# Patient Record
Sex: FEMALE | Race: WHITE | NOT HISPANIC OR LATINO | Employment: OTHER | URBAN - METROPOLITAN AREA
[De-identification: names, ages, dates, MRNs, and addresses within clinical notes are randomized per-mention and may not be internally consistent; named-entity substitution may affect disease eponyms.]

---

## 2018-01-11 ENCOUNTER — OFFICE VISIT CONVERTED (OUTPATIENT)
Dept: UROLOGY | Facility: CLINIC | Age: 83
End: 2018-01-11
Attending: UROLOGY

## 2018-01-25 ENCOUNTER — OFFICE VISIT CONVERTED (OUTPATIENT)
Dept: UROLOGY | Facility: CLINIC | Age: 83
End: 2018-01-25
Attending: UROLOGY

## 2019-03-25 ENCOUNTER — HOSPITAL ENCOUNTER (OUTPATIENT)
Dept: OTHER | Facility: HOSPITAL | Age: 84
Discharge: HOME | End: 2019-03-25
Attending: INTERNAL MEDICINE

## 2019-03-25 LAB
ALBUMIN SERPL-MCNC: 3.8 G/DL (ref 3.5–5)
ALBUMIN/GLOB SERPL: 1.4 {RATIO} (ref 1.4–2.6)
ALP SERPL-CCNC: 61 U/L (ref 43–160)
ALT SERPL-CCNC: 16 U/L (ref 10–40)
ANION GAP SERPL CALC-SCNC: 15 MMOL/L (ref 8–19)
AST SERPL-CCNC: 20 U/L (ref 15–50)
BILIRUB SERPL-MCNC: 0.24 MG/DL (ref 0.2–1.3)
BUN SERPL-MCNC: 19 MG/DL (ref 5–25)
BUN/CREAT SERPL: 32 {RATIO} (ref 6–20)
CALCIUM SERPL-MCNC: 9.2 MG/DL (ref 8.7–10.4)
CHLORIDE SERPL-SCNC: 105 MMOL/L (ref 99–111)
CONV CO2: 26 MMOL/L (ref 22–32)
CONV TOTAL PROTEIN: 6.5 G/DL (ref 6.3–8.2)
CREAT UR-MCNC: 0.6 MG/DL (ref 0.5–0.9)
GFR SERPLBLD BASED ON 1.73 SQ M-ARVRAT: >60 ML/MIN/{1.73_M2}
GLOBULIN UR ELPH-MCNC: 2.7 G/DL (ref 2–3.5)
GLUCOSE SERPL-MCNC: 90 MG/DL (ref 65–99)
OSMOLALITY SERPL CALC.SUM OF ELEC: 296 MOSM/KG (ref 273–304)
POTASSIUM SERPL-SCNC: 4.3 MMOL/L (ref 3.5–5.3)
SODIUM SERPL-SCNC: 142 MMOL/L (ref 135–147)

## 2019-05-28 ENCOUNTER — CONVERSION ENCOUNTER (OUTPATIENT)
Dept: SURGERY | Facility: CLINIC | Age: 84
End: 2019-05-28

## 2019-05-28 ENCOUNTER — OFFICE VISIT CONVERTED (OUTPATIENT)
Dept: SURGERY | Facility: CLINIC | Age: 84
End: 2019-05-28
Attending: NURSE PRACTITIONER

## 2019-09-24 ENCOUNTER — HOSPITAL ENCOUNTER (OUTPATIENT)
Dept: OTHER | Facility: HOSPITAL | Age: 84
Discharge: HOME | End: 2019-09-24
Attending: INTERNAL MEDICINE

## 2019-09-24 LAB
25(OH)D3 SERPL-MCNC: 22.2 NG/ML (ref 30–100)
ALBUMIN SERPL-MCNC: 4.1 G/DL (ref 3.5–5)
ALBUMIN/GLOB SERPL: 1.5 {RATIO} (ref 1.4–2.6)
ALP SERPL-CCNC: 62 U/L (ref 43–160)
ALT SERPL-CCNC: 17 U/L (ref 10–40)
ANION GAP SERPL CALC-SCNC: 21 MMOL/L (ref 8–19)
AST SERPL-CCNC: 20 U/L (ref 15–50)
BASOPHILS # BLD AUTO: 0.05 10*3/UL (ref 0–0.2)
BASOPHILS NFR BLD AUTO: 0.7 % (ref 0–3)
BILIRUB SERPL-MCNC: 0.26 MG/DL (ref 0.2–1.3)
BUN SERPL-MCNC: 20 MG/DL (ref 5–25)
BUN/CREAT SERPL: 30 {RATIO} (ref 6–20)
CALCIUM SERPL-MCNC: 9.7 MG/DL (ref 8.7–10.4)
CHLORIDE SERPL-SCNC: 106 MMOL/L (ref 99–111)
CONV ABS IMM GRAN: 0.04 10*3/UL (ref 0–0.2)
CONV CO2: 22 MMOL/L (ref 22–32)
CONV IMMATURE GRAN: 0.5 % (ref 0–1.8)
CONV TOTAL PROTEIN: 6.8 G/DL (ref 6.3–8.2)
CREAT UR-MCNC: 0.66 MG/DL (ref 0.5–0.9)
DEPRECATED RDW RBC AUTO: 45.6 FL (ref 36.4–46.3)
EOSINOPHIL # BLD AUTO: 0.24 10*3/UL (ref 0–0.7)
EOSINOPHIL # BLD AUTO: 3.2 % (ref 0–7)
ERYTHROCYTE [DISTWIDTH] IN BLOOD BY AUTOMATED COUNT: 13.4 % (ref 11.7–14.4)
GFR SERPLBLD BASED ON 1.73 SQ M-ARVRAT: >60 ML/MIN/{1.73_M2}
GLOBULIN UR ELPH-MCNC: 2.7 G/DL (ref 2–3.5)
GLUCOSE SERPL-MCNC: 89 MG/DL (ref 65–99)
HCT VFR BLD AUTO: 40.2 % (ref 37–47)
HGB BLD-MCNC: 12.8 G/DL (ref 12–16)
LYMPHOCYTES # BLD AUTO: 2.06 10*3/UL (ref 1–5)
LYMPHOCYTES NFR BLD AUTO: 27.1 % (ref 20–45)
MCH RBC QN AUTO: 29.5 PG (ref 27–31)
MCHC RBC AUTO-ENTMCNC: 31.8 G/DL (ref 33–37)
MCV RBC AUTO: 92.6 FL (ref 81–99)
MONOCYTES # BLD AUTO: 0.79 10*3/UL (ref 0.2–1.2)
MONOCYTES NFR BLD AUTO: 10.4 % (ref 3–10)
NEUTROPHILS # BLD AUTO: 4.41 10*3/UL (ref 2–8)
NEUTROPHILS NFR BLD AUTO: 58.1 % (ref 30–85)
NRBC CBCN: 0 % (ref 0–0.7)
OSMOLALITY SERPL CALC.SUM OF ELEC: 302 MOSM/KG (ref 273–304)
PLATELET # BLD AUTO: 301 10*3/UL (ref 130–400)
PMV BLD AUTO: 10.7 FL (ref 9.4–12.3)
POTASSIUM SERPL-SCNC: 4.3 MMOL/L (ref 3.5–5.3)
RBC # BLD AUTO: 4.34 10*6/UL (ref 4.2–5.4)
SODIUM SERPL-SCNC: 145 MMOL/L (ref 135–147)
TSH SERPL-ACNC: 5.8 M[IU]/L (ref 0.27–4.2)
WBC # BLD AUTO: 7.59 10*3/UL (ref 4.8–10.8)

## 2019-10-24 ENCOUNTER — HOSPITAL ENCOUNTER (OUTPATIENT)
Dept: GENERAL RADIOLOGY | Facility: HOSPITAL | Age: 84
Discharge: HOME | End: 2019-10-24
Attending: INTERNAL MEDICINE

## 2020-02-05 ENCOUNTER — OFFICE VISIT CONVERTED (OUTPATIENT)
Dept: SURGERY | Facility: CLINIC | Age: 85
End: 2020-02-05
Attending: SURGERY

## 2020-02-10 ENCOUNTER — HOSPITAL ENCOUNTER (OUTPATIENT)
Dept: LAB | Facility: HOSPITAL | Age: 85
Discharge: HOME | End: 2020-02-10
Attending: INTERNAL MEDICINE

## 2020-02-10 LAB
ALBUMIN SERPL-MCNC: 3.7 G/DL (ref 3.5–5)
ALBUMIN/GLOB SERPL: 1.2 {RATIO} (ref 1.4–2.6)
ALP SERPL-CCNC: 81 U/L (ref 43–160)
ALT SERPL-CCNC: 17 U/L (ref 10–40)
ANION GAP SERPL CALC-SCNC: 16 MMOL/L (ref 8–19)
AST SERPL-CCNC: 20 U/L (ref 15–50)
BASOPHILS # BLD AUTO: 0.05 10*3/UL (ref 0–0.2)
BASOPHILS NFR BLD AUTO: 0.7 % (ref 0–3)
BILIRUB SERPL-MCNC: 0.15 MG/DL (ref 0.2–1.3)
BUN SERPL-MCNC: 15 MG/DL (ref 5–25)
BUN/CREAT SERPL: 21 {RATIO} (ref 6–20)
CALCIUM SERPL-MCNC: 9.7 MG/DL (ref 8.7–10.4)
CHLORIDE SERPL-SCNC: 107 MMOL/L (ref 99–111)
CONV ABS IMM GRAN: 0.02 10*3/UL (ref 0–0.2)
CONV CO2: 25 MMOL/L (ref 22–32)
CONV IMMATURE GRAN: 0.3 % (ref 0–1.8)
CONV TOTAL PROTEIN: 6.8 G/DL (ref 6.3–8.2)
CREAT UR-MCNC: 0.71 MG/DL (ref 0.5–0.9)
DEPRECATED RDW RBC AUTO: 49 FL (ref 36.4–46.3)
EOSINOPHIL # BLD AUTO: 0.21 10*3/UL (ref 0–0.7)
EOSINOPHIL # BLD AUTO: 2.7 % (ref 0–7)
ERYTHROCYTE [DISTWIDTH] IN BLOOD BY AUTOMATED COUNT: 14.5 % (ref 11.7–14.4)
GFR SERPLBLD BASED ON 1.73 SQ M-ARVRAT: >60 ML/MIN/{1.73_M2}
GLOBULIN UR ELPH-MCNC: 3.1 G/DL (ref 2–3.5)
GLUCOSE SERPL-MCNC: 97 MG/DL (ref 65–99)
HCT VFR BLD AUTO: 37.8 % (ref 37–47)
HGB BLD-MCNC: 11.6 G/DL (ref 12–16)
LYMPHOCYTES # BLD AUTO: 3.12 10*3/UL (ref 1–5)
LYMPHOCYTES NFR BLD AUTO: 40.7 % (ref 20–45)
MCH RBC QN AUTO: 28.7 PG (ref 27–31)
MCHC RBC AUTO-ENTMCNC: 30.7 G/DL (ref 33–37)
MCV RBC AUTO: 93.6 FL (ref 81–99)
MONOCYTES # BLD AUTO: 0.86 10*3/UL (ref 0.2–1.2)
MONOCYTES NFR BLD AUTO: 11.2 % (ref 3–10)
NEUTROPHILS # BLD AUTO: 3.4 10*3/UL (ref 2–8)
NEUTROPHILS NFR BLD AUTO: 44.4 % (ref 30–85)
NRBC CBCN: 0 % (ref 0–0.7)
OSMOLALITY SERPL CALC.SUM OF ELEC: 299 MOSM/KG (ref 273–304)
PLATELET # BLD AUTO: 411 10*3/UL (ref 130–400)
PMV BLD AUTO: 10.8 FL (ref 9.4–12.3)
POTASSIUM SERPL-SCNC: 3.9 MMOL/L (ref 3.5–5.3)
RBC # BLD AUTO: 4.04 10*6/UL (ref 4.2–5.4)
SODIUM SERPL-SCNC: 144 MMOL/L (ref 135–147)
WBC # BLD AUTO: 7.66 10*3/UL (ref 4.8–10.8)

## 2020-05-06 ENCOUNTER — HOSPITAL ENCOUNTER (OUTPATIENT)
Dept: OTHER | Facility: HOSPITAL | Age: 85
Discharge: HOME | End: 2020-05-06
Attending: INTERNAL MEDICINE

## 2020-05-06 LAB
ALBUMIN SERPL-MCNC: 3.8 G/DL (ref 3.5–5)
ALBUMIN/GLOB SERPL: 1.4 {RATIO} (ref 1.4–2.6)
ALP SERPL-CCNC: 69 U/L (ref 43–160)
ALT SERPL-CCNC: 14 U/L (ref 10–40)
ANION GAP SERPL CALC-SCNC: 14 MMOL/L (ref 8–19)
AST SERPL-CCNC: 18 U/L (ref 15–50)
BASOPHILS # BLD AUTO: 0.05 10*3/UL (ref 0–0.2)
BASOPHILS NFR BLD AUTO: 0.6 % (ref 0–3)
BILIRUB SERPL-MCNC: 0.23 MG/DL (ref 0.2–1.3)
BUN SERPL-MCNC: 18 MG/DL (ref 5–25)
BUN/CREAT SERPL: 25 {RATIO} (ref 6–20)
CALCIUM SERPL-MCNC: 9.6 MG/DL (ref 8.7–10.4)
CHLORIDE SERPL-SCNC: 107 MMOL/L (ref 99–111)
CONV ABS IMM GRAN: 0.03 10*3/UL (ref 0–0.2)
CONV CO2: 26 MMOL/L (ref 22–32)
CONV IMMATURE GRAN: 0.4 % (ref 0–1.8)
CONV TOTAL PROTEIN: 6.6 G/DL (ref 6.3–8.2)
CREAT UR-MCNC: 0.71 MG/DL (ref 0.5–0.9)
DEPRECATED RDW RBC AUTO: 49.3 FL (ref 36.4–46.3)
EOSINOPHIL # BLD AUTO: 0.12 10*3/UL (ref 0–0.7)
EOSINOPHIL # BLD AUTO: 1.5 % (ref 0–7)
ERYTHROCYTE [DISTWIDTH] IN BLOOD BY AUTOMATED COUNT: 14 % (ref 11.7–14.4)
GFR SERPLBLD BASED ON 1.73 SQ M-ARVRAT: >60 ML/MIN/{1.73_M2}
GLOBULIN UR ELPH-MCNC: 2.8 G/DL (ref 2–3.5)
GLUCOSE SERPL-MCNC: 85 MG/DL (ref 65–99)
HCT VFR BLD AUTO: 37.4 % (ref 37–47)
HGB BLD-MCNC: 11.7 G/DL (ref 12–16)
LYMPHOCYTES # BLD AUTO: 1.89 10*3/UL (ref 1–5)
LYMPHOCYTES NFR BLD AUTO: 23.3 % (ref 20–45)
MCH RBC QN AUTO: 29.6 PG (ref 27–31)
MCHC RBC AUTO-ENTMCNC: 31.3 G/DL (ref 33–37)
MCV RBC AUTO: 94.7 FL (ref 81–99)
MONOCYTES # BLD AUTO: 0.87 10*3/UL (ref 0.2–1.2)
MONOCYTES NFR BLD AUTO: 10.7 % (ref 3–10)
NEUTROPHILS # BLD AUTO: 5.15 10*3/UL (ref 2–8)
NEUTROPHILS NFR BLD AUTO: 63.5 % (ref 30–85)
NRBC CBCN: 0 % (ref 0–0.7)
OSMOLALITY SERPL CALC.SUM OF ELEC: 297 MOSM/KG (ref 273–304)
PLATELET # BLD AUTO: 276 10*3/UL (ref 130–400)
PMV BLD AUTO: 11.3 FL (ref 9.4–12.3)
POTASSIUM SERPL-SCNC: 4.2 MMOL/L (ref 3.5–5.3)
RBC # BLD AUTO: 3.95 10*6/UL (ref 4.2–5.4)
SODIUM SERPL-SCNC: 143 MMOL/L (ref 135–147)
WBC # BLD AUTO: 8.11 10*3/UL (ref 4.8–10.8)

## 2020-11-12 ENCOUNTER — HOSPITAL ENCOUNTER (OUTPATIENT)
Dept: GENERAL RADIOLOGY | Facility: HOSPITAL | Age: 85
Discharge: HOME | End: 2020-11-12
Attending: INTERNAL MEDICINE

## 2020-11-12 LAB
25(OH)D3 SERPL-MCNC: 26.4 NG/ML (ref 30–100)
ALBUMIN SERPL-MCNC: 4 G/DL (ref 3.5–5)
ALBUMIN/GLOB SERPL: 1.4 {RATIO} (ref 1.4–2.6)
ALP SERPL-CCNC: 69 U/L (ref 43–160)
ALT SERPL-CCNC: 13 U/L (ref 10–40)
ANION GAP SERPL CALC-SCNC: 15 MMOL/L (ref 8–19)
AST SERPL-CCNC: 19 U/L (ref 15–50)
BASOPHILS # BLD AUTO: 0.03 10*3/UL (ref 0–0.2)
BASOPHILS NFR BLD AUTO: 0.4 % (ref 0–3)
BILIRUB SERPL-MCNC: 0.25 MG/DL (ref 0.2–1.3)
BUN SERPL-MCNC: 13 MG/DL (ref 5–25)
BUN/CREAT SERPL: 17 {RATIO} (ref 6–20)
CALCIUM SERPL-MCNC: 10.1 MG/DL (ref 8.7–10.4)
CHLORIDE SERPL-SCNC: 105 MMOL/L (ref 99–111)
CONV ABS IMM GRAN: 0.03 10*3/UL (ref 0–0.2)
CONV CO2: 27 MMOL/L (ref 22–32)
CONV IMMATURE GRAN: 0.4 % (ref 0–1.8)
CONV TOTAL PROTEIN: 6.8 G/DL (ref 6.3–8.2)
CREAT UR-MCNC: 0.78 MG/DL (ref 0.5–0.9)
DEPRECATED RDW RBC AUTO: 43.6 FL (ref 36.4–46.3)
EOSINOPHIL # BLD AUTO: 0.09 10*3/UL (ref 0–0.7)
EOSINOPHIL # BLD AUTO: 1.1 % (ref 0–7)
ERYTHROCYTE [DISTWIDTH] IN BLOOD BY AUTOMATED COUNT: 13.1 % (ref 11.7–14.4)
GFR SERPLBLD BASED ON 1.73 SQ M-ARVRAT: >60 ML/MIN/{1.73_M2}
GLOBULIN UR ELPH-MCNC: 2.8 G/DL (ref 2–3.5)
GLUCOSE SERPL-MCNC: 96 MG/DL (ref 65–99)
HCT VFR BLD AUTO: 39.9 % (ref 37–47)
HGB BLD-MCNC: 12.8 G/DL (ref 12–16)
LYMPHOCYTES # BLD AUTO: 2.05 10*3/UL (ref 1–5)
LYMPHOCYTES NFR BLD AUTO: 24 % (ref 20–45)
MCH RBC QN AUTO: 29.4 PG (ref 27–31)
MCHC RBC AUTO-ENTMCNC: 32.1 G/DL (ref 33–37)
MCV RBC AUTO: 91.7 FL (ref 81–99)
MONOCYTES # BLD AUTO: 0.72 10*3/UL (ref 0.2–1.2)
MONOCYTES NFR BLD AUTO: 8.4 % (ref 3–10)
NEUTROPHILS # BLD AUTO: 5.63 10*3/UL (ref 2–8)
NEUTROPHILS NFR BLD AUTO: 65.7 % (ref 30–85)
NRBC CBCN: 0 % (ref 0–0.7)
OSMOLALITY SERPL CALC.SUM OF ELEC: 296 MOSM/KG (ref 273–304)
PLATELET # BLD AUTO: 295 10*3/UL (ref 130–400)
PMV BLD AUTO: 10.3 FL (ref 9.4–12.3)
POTASSIUM SERPL-SCNC: 4.3 MMOL/L (ref 3.5–5.3)
RBC # BLD AUTO: 4.35 10*6/UL (ref 4.2–5.4)
SODIUM SERPL-SCNC: 143 MMOL/L (ref 135–147)
T4 FREE SERPL-MCNC: 1.2 NG/DL (ref 0.9–1.8)
TSH SERPL-ACNC: 2.75 M[IU]/L (ref 0.27–4.2)
WBC # BLD AUTO: 8.55 10*3/UL (ref 4.8–10.8)

## 2020-11-18 ENCOUNTER — HOSPITAL ENCOUNTER (OUTPATIENT)
Dept: LAB | Facility: HOSPITAL | Age: 85
Discharge: HOME | End: 2020-11-18
Attending: INTERNAL MEDICINE

## 2020-11-18 LAB
AMPHETAMINES UR QL SCN: NEGATIVE
BARBITURATES UR QL SCN: NEGATIVE
BENZODIAZ UR QL SCN: NEGATIVE
CONV COCAINE, UR: NEGATIVE
METHADONE UR QL SCN: NEGATIVE
OPIATES TESTED UR SCN: NEGATIVE
OXYCODONE UR QL SCN: NEGATIVE
PCP UR QL: NEGATIVE
THC SERPLBLD CFM-MCNC: NEGATIVE NG/ML

## 2021-05-15 VITALS — HEIGHT: 59 IN | BODY MASS INDEX: 26.21 KG/M2 | WEIGHT: 130 LBS | RESPIRATION RATE: 16 BRPM

## 2021-05-15 VITALS — WEIGHT: 128 LBS | BODY MASS INDEX: 25.8 KG/M2 | HEIGHT: 59 IN | HEART RATE: 78 BPM | OXYGEN SATURATION: 99 %

## 2021-06-17 ENCOUNTER — LAB (OUTPATIENT)
Dept: LAB | Facility: HOSPITAL | Age: 86
End: 2021-06-17

## 2021-06-17 ENCOUNTER — TRANSCRIBE ORDERS (OUTPATIENT)
Dept: LAB | Facility: HOSPITAL | Age: 86
End: 2021-06-17

## 2021-06-17 DIAGNOSIS — E03.9 HYPOTHYROIDISM, UNSPECIFIED TYPE: ICD-10-CM

## 2021-06-17 DIAGNOSIS — I10 HYPERTENSION, ESSENTIAL: ICD-10-CM

## 2021-06-17 DIAGNOSIS — Z79.899 ENCOUNTER FOR LONG-TERM (CURRENT) USE OF OTHER MEDICATIONS: ICD-10-CM

## 2021-06-17 DIAGNOSIS — R73.01 IMPAIRED FASTING GLUCOSE: ICD-10-CM

## 2021-06-17 DIAGNOSIS — I10 HYPERTENSION, ESSENTIAL: Primary | ICD-10-CM

## 2021-06-17 LAB
ALBUMIN SERPL-MCNC: 4.2 G/DL (ref 3.5–5.2)
ALBUMIN/GLOB SERPL: 2.2 G/DL
ALP SERPL-CCNC: 75 U/L (ref 39–117)
ALT SERPL W P-5'-P-CCNC: 17 U/L (ref 1–33)
ANION GAP SERPL CALCULATED.3IONS-SCNC: 6.7 MMOL/L (ref 5–15)
AST SERPL-CCNC: 23 U/L (ref 1–32)
BASOPHILS # BLD AUTO: 0.04 10*3/MM3 (ref 0–0.2)
BASOPHILS NFR BLD AUTO: 0.6 % (ref 0–1.5)
BILIRUB SERPL-MCNC: 0.3 MG/DL (ref 0–1.2)
BUN SERPL-MCNC: 17 MG/DL (ref 8–23)
BUN/CREAT SERPL: 25 (ref 7–25)
CALCIUM SPEC-SCNC: 9.3 MG/DL (ref 8.6–10.5)
CHLORIDE SERPL-SCNC: 103 MMOL/L (ref 98–107)
CHOLEST SERPL-MCNC: 201 MG/DL (ref 0–200)
CO2 SERPL-SCNC: 29.3 MMOL/L (ref 22–29)
CREAT SERPL-MCNC: 0.68 MG/DL (ref 0.57–1)
DEPRECATED RDW RBC AUTO: 40.2 FL (ref 37–54)
EOSINOPHIL # BLD AUTO: 0.13 10*3/MM3 (ref 0–0.4)
EOSINOPHIL NFR BLD AUTO: 2 % (ref 0.3–6.2)
ERYTHROCYTE [DISTWIDTH] IN BLOOD BY AUTOMATED COUNT: 12.4 % (ref 12.3–15.4)
GFR SERPL CREATININE-BSD FRML MDRD: 82 ML/MIN/1.73
GLOBULIN UR ELPH-MCNC: 1.9 GM/DL
GLUCOSE SERPL-MCNC: 77 MG/DL (ref 65–99)
HCT VFR BLD AUTO: 38.2 % (ref 34–46.6)
HDLC SERPL-MCNC: 65 MG/DL (ref 40–60)
HGB BLD-MCNC: 12.8 G/DL (ref 12–15.9)
IMM GRANULOCYTES # BLD AUTO: 0.03 10*3/MM3 (ref 0–0.05)
IMM GRANULOCYTES NFR BLD AUTO: 0.5 % (ref 0–0.5)
LDLC SERPL CALC-MCNC: 120 MG/DL (ref 0–100)
LDLC/HDLC SERPL: 1.82 {RATIO}
LYMPHOCYTES # BLD AUTO: 1.66 10*3/MM3 (ref 0.7–3.1)
LYMPHOCYTES NFR BLD AUTO: 25.2 % (ref 19.6–45.3)
MCH RBC QN AUTO: 29.6 PG (ref 26.6–33)
MCHC RBC AUTO-ENTMCNC: 33.5 G/DL (ref 31.5–35.7)
MCV RBC AUTO: 88.4 FL (ref 79–97)
MONOCYTES # BLD AUTO: 0.77 10*3/MM3 (ref 0.1–0.9)
MONOCYTES NFR BLD AUTO: 11.7 % (ref 5–12)
NEUTROPHILS NFR BLD AUTO: 3.96 10*3/MM3 (ref 1.7–7)
NEUTROPHILS NFR BLD AUTO: 60 % (ref 42.7–76)
NRBC BLD AUTO-RTO: 0 /100 WBC (ref 0–0.2)
PLATELET # BLD AUTO: 268 10*3/MM3 (ref 140–450)
PMV BLD AUTO: 10.8 FL (ref 6–12)
POTASSIUM SERPL-SCNC: 4.9 MMOL/L (ref 3.5–5.2)
PROT SERPL-MCNC: 6.1 G/DL (ref 6–8.5)
RBC # BLD AUTO: 4.32 10*6/MM3 (ref 3.77–5.28)
SODIUM SERPL-SCNC: 139 MMOL/L (ref 136–145)
TRIGL SERPL-MCNC: 87 MG/DL (ref 0–150)
TSH SERPL DL<=0.05 MIU/L-ACNC: 3.64 UIU/ML (ref 0.27–4.2)
VLDLC SERPL-MCNC: 16 MG/DL (ref 5–40)
WBC # BLD AUTO: 6.59 10*3/MM3 (ref 3.4–10.8)

## 2021-06-17 PROCEDURE — 80053 COMPREHEN METABOLIC PANEL: CPT

## 2021-06-17 PROCEDURE — 85025 COMPLETE CBC W/AUTO DIFF WBC: CPT

## 2021-06-17 PROCEDURE — 80061 LIPID PANEL: CPT

## 2021-06-17 PROCEDURE — 36415 COLL VENOUS BLD VENIPUNCTURE: CPT

## 2021-06-17 PROCEDURE — 84443 ASSAY THYROID STIM HORMONE: CPT

## 2021-10-14 ENCOUNTER — CLINICAL SUPPORT (OUTPATIENT)
Dept: INTERNAL MEDICINE | Facility: CLINIC | Age: 86
End: 2021-10-14

## 2021-10-14 DIAGNOSIS — Z23 NEED FOR INFLUENZA VACCINATION: Primary | ICD-10-CM

## 2021-10-14 DIAGNOSIS — Z23 NEED FOR IMMUNIZATION AGAINST INFLUENZA: ICD-10-CM

## 2021-10-14 PROCEDURE — G0008 ADMIN INFLUENZA VIRUS VAC: HCPCS

## 2021-10-14 PROCEDURE — 90662 IIV NO PRSV INCREASED AG IM: CPT

## 2021-11-29 DIAGNOSIS — G62.9 PERIPHERAL POLYNEUROPATHY: Primary | ICD-10-CM

## 2021-11-29 RX ORDER — PANTOPRAZOLE SODIUM 40 MG/1
TABLET, DELAYED RELEASE ORAL
Qty: 90 TABLET | Refills: 0 | Status: SHIPPED | OUTPATIENT
Start: 2021-11-29 | End: 2022-02-28 | Stop reason: SDUPTHER

## 2021-11-30 RX ORDER — GABAPENTIN 600 MG/1
TABLET ORAL
Qty: 90 TABLET | Refills: 0 | Status: SHIPPED | OUTPATIENT
Start: 2021-11-30 | End: 2021-12-27

## 2021-12-27 DIAGNOSIS — G62.9 PERIPHERAL POLYNEUROPATHY: ICD-10-CM

## 2021-12-28 ENCOUNTER — LAB (OUTPATIENT)
Dept: LAB | Facility: HOSPITAL | Age: 86
End: 2021-12-28

## 2021-12-28 ENCOUNTER — TRANSCRIBE ORDERS (OUTPATIENT)
Dept: LAB | Facility: HOSPITAL | Age: 86
End: 2021-12-28

## 2021-12-28 DIAGNOSIS — D64.9 ANEMIA, UNSPECIFIED TYPE: ICD-10-CM

## 2021-12-28 DIAGNOSIS — I50.9 HEART FAILURE, UNSPECIFIED HF CHRONICITY, UNSPECIFIED HEART FAILURE TYPE: ICD-10-CM

## 2021-12-28 DIAGNOSIS — I10 ESSENTIAL HYPERTENSION, MALIGNANT: ICD-10-CM

## 2021-12-28 DIAGNOSIS — I10 ESSENTIAL HYPERTENSION, MALIGNANT: Primary | ICD-10-CM

## 2021-12-28 DIAGNOSIS — Z79.899 ENCOUNTER FOR LONG-TERM (CURRENT) USE OF OTHER MEDICATIONS: ICD-10-CM

## 2021-12-28 LAB
ALBUMIN SERPL-MCNC: 4 G/DL (ref 3.5–5.2)
ALBUMIN/GLOB SERPL: 1.6 G/DL
ALP SERPL-CCNC: 76 U/L (ref 39–117)
ALT SERPL W P-5'-P-CCNC: 11 U/L (ref 1–33)
ANION GAP SERPL CALCULATED.3IONS-SCNC: 6.1 MMOL/L (ref 5–15)
AST SERPL-CCNC: 17 U/L (ref 1–32)
BASOPHILS # BLD AUTO: 0.04 10*3/MM3 (ref 0–0.2)
BASOPHILS NFR BLD AUTO: 0.6 % (ref 0–1.5)
BILIRUB SERPL-MCNC: 0.4 MG/DL (ref 0–1.2)
BUN SERPL-MCNC: 11 MG/DL (ref 8–23)
BUN/CREAT SERPL: 16.2 (ref 7–25)
CALCIUM SPEC-SCNC: 9.5 MG/DL (ref 8.6–10.5)
CHLORIDE SERPL-SCNC: 104 MMOL/L (ref 98–107)
CO2 SERPL-SCNC: 30.9 MMOL/L (ref 22–29)
CREAT SERPL-MCNC: 0.68 MG/DL (ref 0.57–1)
DEPRECATED RDW RBC AUTO: 41.1 FL (ref 37–54)
EOSINOPHIL # BLD AUTO: 0.12 10*3/MM3 (ref 0–0.4)
EOSINOPHIL NFR BLD AUTO: 1.8 % (ref 0.3–6.2)
ERYTHROCYTE [DISTWIDTH] IN BLOOD BY AUTOMATED COUNT: 12.7 % (ref 12.3–15.4)
GFR SERPL CREATININE-BSD FRML MDRD: 82 ML/MIN/1.73
GLOBULIN UR ELPH-MCNC: 2.5 GM/DL
GLUCOSE SERPL-MCNC: 84 MG/DL (ref 65–99)
HCT VFR BLD AUTO: 37.7 % (ref 34–46.6)
HGB BLD-MCNC: 12.3 G/DL (ref 12–15.9)
IMM GRANULOCYTES # BLD AUTO: 0.02 10*3/MM3 (ref 0–0.05)
IMM GRANULOCYTES NFR BLD AUTO: 0.3 % (ref 0–0.5)
LYMPHOCYTES # BLD AUTO: 1.92 10*3/MM3 (ref 0.7–3.1)
LYMPHOCYTES NFR BLD AUTO: 28.4 % (ref 19.6–45.3)
MCH RBC QN AUTO: 29.1 PG (ref 26.6–33)
MCHC RBC AUTO-ENTMCNC: 32.6 G/DL (ref 31.5–35.7)
MCV RBC AUTO: 89.3 FL (ref 79–97)
MONOCYTES # BLD AUTO: 0.82 10*3/MM3 (ref 0.1–0.9)
MONOCYTES NFR BLD AUTO: 12.1 % (ref 5–12)
NEUTROPHILS NFR BLD AUTO: 3.84 10*3/MM3 (ref 1.7–7)
NEUTROPHILS NFR BLD AUTO: 56.8 % (ref 42.7–76)
NRBC BLD AUTO-RTO: 0 /100 WBC (ref 0–0.2)
PLATELET # BLD AUTO: 265 10*3/MM3 (ref 140–450)
PMV BLD AUTO: 10.6 FL (ref 6–12)
POTASSIUM SERPL-SCNC: 4.5 MMOL/L (ref 3.5–5.2)
PROT SERPL-MCNC: 6.5 G/DL (ref 6–8.5)
RBC # BLD AUTO: 4.22 10*6/MM3 (ref 3.77–5.28)
SODIUM SERPL-SCNC: 141 MMOL/L (ref 136–145)
WBC NRBC COR # BLD: 6.76 10*3/MM3 (ref 3.4–10.8)

## 2021-12-28 PROCEDURE — 36415 COLL VENOUS BLD VENIPUNCTURE: CPT

## 2021-12-28 PROCEDURE — 80053 COMPREHEN METABOLIC PANEL: CPT

## 2021-12-28 PROCEDURE — 85025 COMPLETE CBC W/AUTO DIFF WBC: CPT

## 2021-12-28 RX ORDER — GABAPENTIN 600 MG/1
TABLET ORAL
Qty: 90 TABLET | Refills: 5 | Status: SHIPPED | OUTPATIENT
Start: 2021-12-28 | End: 2022-07-11

## 2021-12-28 RX ORDER — TRAMADOL HYDROCHLORIDE 50 MG/1
TABLET ORAL
Qty: 90 TABLET | Refills: 5 | Status: SHIPPED | OUTPATIENT
Start: 2021-12-28 | End: 2022-07-11

## 2022-01-05 ENCOUNTER — OFFICE VISIT (OUTPATIENT)
Dept: INTERNAL MEDICINE | Facility: CLINIC | Age: 87
End: 2022-01-05

## 2022-01-05 VITALS
WEIGHT: 123.6 LBS | HEART RATE: 57 BPM | OXYGEN SATURATION: 94 % | TEMPERATURE: 97.9 F | DIASTOLIC BLOOD PRESSURE: 71 MMHG | BODY MASS INDEX: 24.92 KG/M2 | SYSTOLIC BLOOD PRESSURE: 122 MMHG | HEIGHT: 59 IN

## 2022-01-05 DIAGNOSIS — M47.892 OTHER OSTEOARTHRITIS OF SPINE, CERVICAL REGION: ICD-10-CM

## 2022-01-05 DIAGNOSIS — E55.9 VITAMIN D DEFICIENCY: ICD-10-CM

## 2022-01-05 DIAGNOSIS — B02.22 TRIGEMINAL HERPES ZOSTER: ICD-10-CM

## 2022-01-05 DIAGNOSIS — I10 HYPERTENSION, ESSENTIAL: ICD-10-CM

## 2022-01-05 DIAGNOSIS — G62.9 PERIPHERAL POLYNEUROPATHY: Primary | ICD-10-CM

## 2022-01-05 PROBLEM — M47.9 SPINAL OSTEOARTHRITIS: Status: ACTIVE | Noted: 2022-01-05

## 2022-01-05 PROCEDURE — 99214 OFFICE O/P EST MOD 30 MIN: CPT | Performed by: INTERNAL MEDICINE

## 2022-01-05 RX ORDER — ERYTHROMYCIN 5 MG/G
OINTMENT OPHTHALMIC
COMMUNITY
End: 2022-07-18

## 2022-01-05 RX ORDER — LEVOFLOXACIN 500 MG/1
TABLET, FILM COATED ORAL
COMMUNITY
End: 2022-07-18

## 2022-01-05 RX ORDER — LATANOPROST 50 UG/ML
SOLUTION/ DROPS OPHTHALMIC
COMMUNITY

## 2022-01-05 RX ORDER — LATANOPROST 50 UG/ML
SOLUTION/ DROPS OPHTHALMIC
COMMUNITY
Start: 2021-12-27 | End: 2022-07-18

## 2022-01-05 RX ORDER — BRIMONIDINE TARTRATE 2 MG/ML
SOLUTION/ DROPS OPHTHALMIC
COMMUNITY
Start: 2021-12-29 | End: 2022-07-18

## 2022-01-05 RX ORDER — IBUPROFEN 200 MG
TABLET ORAL
COMMUNITY
End: 2022-07-18

## 2022-01-05 RX ORDER — ONDANSETRON 4 MG/1
TABLET, ORALLY DISINTEGRATING ORAL
COMMUNITY
End: 2022-07-18

## 2022-01-05 RX ORDER — BRIMONIDINE TARTRATE/TIMOLOL 0.2%-0.5%
1 DROPS OPHTHALMIC (EYE)
COMMUNITY

## 2022-01-05 RX ORDER — ACETAMINOPHEN 500 MG
TABLET ORAL
COMMUNITY

## 2022-01-05 RX ORDER — METOPROLOL SUCCINATE 25 MG/1
TABLET, EXTENDED RELEASE ORAL
COMMUNITY
End: 2022-07-18

## 2022-01-05 RX ORDER — LISINOPRIL 5 MG/1
TABLET ORAL
COMMUNITY
End: 2022-07-18

## 2022-01-05 RX ORDER — LISINOPRIL 10 MG/1
TABLET ORAL
COMMUNITY
End: 2022-07-18

## 2022-01-05 RX ORDER — KETOTIFEN FUMARATE 0.35 MG/ML
SOLUTION/ DROPS OPHTHALMIC
COMMUNITY
End: 2023-01-18

## 2022-01-05 NOTE — PROGRESS NOTES
"Chief Complaint/ HPI: Follow-up with chronic pain syndrome, back pain hip pain, and trigeminal neuralgia which is improved some according to patient, no falls recently, she uses a Rollator walker at home, she is here with her daughter today,          Objective   Vital Signs  Vitals:    01/05/22 1112   BP: 122/71   Pulse: 57   Temp: 97.9 °F (36.6 °C)   SpO2: 94%   Weight: 56.1 kg (123 lb 9.6 oz)   Height: 149.9 cm (59.02\")      Body mass index is 24.95 kg/m².  Review of Systems   Constitutional: Negative.    HENT: Negative.    Eyes: Negative.    Respiratory: Negative.    Cardiovascular: Negative.    Gastrointestinal: Negative.    Endocrine: Negative.    Genitourinary: Negative.    Musculoskeletal: Negative.    Allergic/Immunologic: Negative.    Neurological: Negative.    Hematological: Negative.    Psychiatric/Behavioral: Negative.    Hip pain, trigeminal nerve pain still some improved,  Physical Exam  Constitutional:       General: She is not in acute distress.     Appearance: Normal appearance.   HENT:      Head: Normocephalic.      Mouth/Throat:      Mouth: Mucous membranes are moist.   Eyes:      Conjunctiva/sclera: Conjunctivae normal.      Pupils: Pupils are equal, round, and reactive to light.   Cardiovascular:      Rate and Rhythm: Normal rate and regular rhythm.      Pulses: Normal pulses.      Heart sounds: Normal heart sounds.   Pulmonary:      Effort: Pulmonary effort is normal.      Breath sounds: Normal breath sounds.   Abdominal:      General: Abdomen is flat. Bowel sounds are normal.      Palpations: Abdomen is soft.   Musculoskeletal:         General: No swelling. Normal range of motion.      Cervical back: Neck supple.   Skin:     General: Skin is warm and dry.      Coloration: Skin is not jaundiced.   Neurological:      General: No focal deficit present.      Mental Status: She is alert and oriented to person, place, and time. Mental status is at baseline.   Psychiatric:         Mood and Affect: " Mood normal.         Behavior: Behavior normal.         Thought Content: Thought content normal.         Judgment: Judgment normal.        Result Review :   No results found for: PROBNP, BNP  CMP    CMP 6/17/21 12/28/21   Glucose 77 84   BUN 17 11   Creatinine 0.68 0.68   eGFR Non African Am 82 82   Sodium 139 141   Potassium 4.9 4.5   Chloride 103 104   Calcium 9.3 9.5   Albumin 4.20 4.00   Total Bilirubin 0.3 0.4   Alkaline Phosphatase 75 76   AST (SGOT) 23 17   ALT (SGPT) 17 11           CBC w/diff    CBC w/Diff 6/17/21 12/28/21   WBC 6.59 6.76   RBC 4.32 4.22   Hemoglobin 12.8 12.3   Hematocrit 38.2 37.7   MCV 88.4 89.3   MCH 29.6 29.1   MCHC 33.5 32.6   RDW 12.4 12.7   Platelets 268 265   Neutrophil Rel % 60.0 56.8   Immature Granulocyte Rel % 0.5 0.3   Lymphocyte Rel % 25.2 28.4   Monocyte Rel % 11.7 12.1 (A)   Eosinophil Rel % 2.0 1.8   Basophil Rel % 0.6 0.6   (A) Abnormal value             Lipid Panel    Lipid Panel 6/17/21   Total Cholesterol 201 (A)   Triglycerides 87   HDL Cholesterol 65 (A)   VLDL Cholesterol 16   LDL Cholesterol  120 (A)   LDL/HDL Ratio 1.82   (A) Abnormal value             Lab Results   Component Value Date    TSH 3.640 06/17/2021    TSH 2.750 11/12/2020    TSH 5.800 (H) 09/24/2019      Lab Results   Component Value Date    FREET4 1.2 11/12/2020                          Visit Diagnoses:    ICD-10-CM ICD-9-CM   1. Peripheral polyneuropathy  G62.9 356.9   2. Hypertension, essential  I10 401.9   3. Vitamin D deficiency  E55.9 268.9   4. Trigeminal herpes zoster  B02.22 053.12   5. Other osteoarthritis of spine, cervical region  M47.892 721.0       Assessment and Plan   Diagnoses and all orders for this visit:    1. Peripheral polyneuropathy (Primary)  -     Lipid Panel; Future  -     Comprehensive Metabolic Panel; Future  -     CBC & Differential; Future    2. Hypertension, essential  -     Lipid Panel; Future  -     Comprehensive Metabolic Panel; Future  -     CBC & Differential;  Future    3. Vitamin D deficiency  -     Lipid Panel; Future  -     Comprehensive Metabolic Panel; Future  -     CBC & Differential; Future    4. Trigeminal herpes zoster  -     Lipid Panel; Future  -     Comprehensive Metabolic Panel; Future  -     CBC & Differential; Future    5. Other osteoarthritis of spine, cervical region  -     Lipid Panel; Future  -     Comprehensive Metabolic Panel; Future  -     CBC & Differential; Future        Herpes zoster, trigeminal branch mandibular branch left cheek, previous treatments with gabapentin acyclovir pain medications, going back to May 2020, somewhat improved as of January 2022,    Osteoarthritis of the neck of neuropathy, left neck mainly, currently on gabapentin, tramadol, continues    Vitamin D deficiency    Hypertension continues on lisinopril 20 mg daily, Toprol-XL 25 mg daily    Chronic pain syndrome chronic right hip pain low back pain on tramadol 3 times daily gabapentin as above    Previous cataract surgery    Glaucoma    Previous appendectomy, previous right total hip arthroplasty 2000    Known spinal stenosis    History of kidney stone with UTI and sepsis December 2017 in the hospital        Follow Up   Return in about 6 months (around 7/5/2022).  Patient was given instructions and counseling regarding her condition or for health maintenance advice. Please see specific information pulled into the AVS if appropriate.

## 2022-02-28 RX ORDER — PANTOPRAZOLE SODIUM 40 MG/1
40 TABLET, DELAYED RELEASE ORAL DAILY
Qty: 90 TABLET | Refills: 3 | Status: SHIPPED | OUTPATIENT
Start: 2022-02-28 | End: 2022-07-18

## 2022-02-28 RX ORDER — PANTOPRAZOLE SODIUM 40 MG/1
TABLET, DELAYED RELEASE ORAL
Qty: 90 TABLET | Refills: 3 | Status: SHIPPED | OUTPATIENT
Start: 2022-02-28 | End: 2023-01-18 | Stop reason: SDUPTHER

## 2022-02-28 NOTE — TELEPHONE ENCOUNTER
Caller: Liudmila Liu Luis Manuel    Relationship: Self    Best call back number: [phone]    Requested Prescriptions:   Requested Prescriptions     Pending Prescriptions Disp Refills   • pantoprazole (PROTONIX) 40 MG EC tablet 90 tablet 0     Sig: Take 1 tablet by mouth Daily.        Pharmacy where request should be sent: [facility] [address] [place] [address] [phone] [facility] [phone] FX     Additional details provided by patient: PATIENT STATES THAT SHE WANTED TO MAKE SURE WE GOT A REFILL REQUEST.    Does the patient have less than a 3 day supply:  [x] Yes  [] No    Alfie Nolan Rep   02/28/22 11:06 EST

## 2022-06-27 NOTE — TELEPHONE ENCOUNTER
Caller: Liudmila Liu    Relationship: Self    Best call back number: [phone] [place]     [phone]    Requested Prescriptions:   Requested Prescriptions     Pending Prescriptions Disp Refills   • metoprolol tartrate (LOPRESSOR) 25 MG tablet          Pharmacy where request should be sent: [facility] [address] [place] [address] [phone] [facility] [phone]      Additional details provided by patient:    THE PATIENT IS WANTING THIS MEDICATION TO BE REFILLED. SHE SAID HER DAUGHTER IS COMING IN [place] IN THE MORNING AND WILL PICK IT UP. SHE IS WANTING IF THIS CAN BE WORKED ON ASAP.       Does the patient have less than a 3 day supply:  [x] Yes  [] No    Hope Alfie Cervantes Rep   06/27/22 12:33 EDT

## 2022-07-05 ENCOUNTER — LAB (OUTPATIENT)
Dept: LAB | Facility: HOSPITAL | Age: 87
End: 2022-07-05

## 2022-07-05 DIAGNOSIS — M47.892 OTHER OSTEOARTHRITIS OF SPINE, CERVICAL REGION: ICD-10-CM

## 2022-07-05 DIAGNOSIS — E55.9 VITAMIN D DEFICIENCY: ICD-10-CM

## 2022-07-05 DIAGNOSIS — I10 HYPERTENSION, ESSENTIAL: ICD-10-CM

## 2022-07-05 DIAGNOSIS — G62.9 PERIPHERAL POLYNEUROPATHY: ICD-10-CM

## 2022-07-05 DIAGNOSIS — B02.22 TRIGEMINAL HERPES ZOSTER: ICD-10-CM

## 2022-07-05 LAB
ALBUMIN SERPL-MCNC: 4 G/DL (ref 3.5–5.2)
ALBUMIN/GLOB SERPL: 1.7 G/DL
ALP SERPL-CCNC: 68 U/L (ref 39–117)
ALT SERPL W P-5'-P-CCNC: 15 U/L (ref 1–33)
ANION GAP SERPL CALCULATED.3IONS-SCNC: 9 MMOL/L (ref 5–15)
AST SERPL-CCNC: 21 U/L (ref 1–32)
BASOPHILS # BLD AUTO: 0.04 10*3/MM3 (ref 0–0.2)
BASOPHILS NFR BLD AUTO: 0.7 % (ref 0–1.5)
BILIRUB SERPL-MCNC: 0.3 MG/DL (ref 0–1.2)
BUN SERPL-MCNC: 18 MG/DL (ref 8–23)
BUN/CREAT SERPL: 22 (ref 7–25)
CALCIUM SPEC-SCNC: 9.5 MG/DL (ref 8.6–10.5)
CHLORIDE SERPL-SCNC: 104 MMOL/L (ref 98–107)
CHOLEST SERPL-MCNC: 170 MG/DL (ref 0–200)
CO2 SERPL-SCNC: 26 MMOL/L (ref 22–29)
CREAT SERPL-MCNC: 0.82 MG/DL (ref 0.57–1)
DEPRECATED RDW RBC AUTO: 42.6 FL (ref 37–54)
EGFRCR SERPLBLD CKD-EPI 2021: 68.9 ML/MIN/1.73
EOSINOPHIL # BLD AUTO: 0.26 10*3/MM3 (ref 0–0.4)
EOSINOPHIL NFR BLD AUTO: 4.3 % (ref 0.3–6.2)
ERYTHROCYTE [DISTWIDTH] IN BLOOD BY AUTOMATED COUNT: 13.2 % (ref 12.3–15.4)
GLOBULIN UR ELPH-MCNC: 2.4 GM/DL
GLUCOSE SERPL-MCNC: 80 MG/DL (ref 65–99)
HCT VFR BLD AUTO: 39.7 % (ref 34–46.6)
HDLC SERPL-MCNC: 57 MG/DL (ref 40–60)
HGB BLD-MCNC: 12.8 G/DL (ref 12–15.9)
IMM GRANULOCYTES # BLD AUTO: 0.02 10*3/MM3 (ref 0–0.05)
IMM GRANULOCYTES NFR BLD AUTO: 0.3 % (ref 0–0.5)
LDLC SERPL CALC-MCNC: 93 MG/DL (ref 0–100)
LDLC/HDLC SERPL: 1.59 {RATIO}
LYMPHOCYTES # BLD AUTO: 2.03 10*3/MM3 (ref 0.7–3.1)
LYMPHOCYTES NFR BLD AUTO: 33.9 % (ref 19.6–45.3)
MCH RBC QN AUTO: 29.2 PG (ref 26.6–33)
MCHC RBC AUTO-ENTMCNC: 32.2 G/DL (ref 31.5–35.7)
MCV RBC AUTO: 90.4 FL (ref 79–97)
MONOCYTES # BLD AUTO: 0.83 10*3/MM3 (ref 0.1–0.9)
MONOCYTES NFR BLD AUTO: 13.9 % (ref 5–12)
NEUTROPHILS NFR BLD AUTO: 2.81 10*3/MM3 (ref 1.7–7)
NEUTROPHILS NFR BLD AUTO: 46.9 % (ref 42.7–76)
NRBC BLD AUTO-RTO: 0 /100 WBC (ref 0–0.2)
PLATELET # BLD AUTO: 287 10*3/MM3 (ref 140–450)
PMV BLD AUTO: 10.4 FL (ref 6–12)
POTASSIUM SERPL-SCNC: 4.6 MMOL/L (ref 3.5–5.2)
PROT SERPL-MCNC: 6.4 G/DL (ref 6–8.5)
RBC # BLD AUTO: 4.39 10*6/MM3 (ref 3.77–5.28)
SODIUM SERPL-SCNC: 139 MMOL/L (ref 136–145)
TRIGL SERPL-MCNC: 112 MG/DL (ref 0–150)
VLDLC SERPL-MCNC: 20 MG/DL (ref 5–40)
WBC NRBC COR # BLD: 5.99 10*3/MM3 (ref 3.4–10.8)

## 2022-07-05 PROCEDURE — 80053 COMPREHEN METABOLIC PANEL: CPT

## 2022-07-05 PROCEDURE — 36415 COLL VENOUS BLD VENIPUNCTURE: CPT

## 2022-07-05 PROCEDURE — 80061 LIPID PANEL: CPT

## 2022-07-05 PROCEDURE — 85025 COMPLETE CBC W/AUTO DIFF WBC: CPT

## 2022-07-09 DIAGNOSIS — G62.9 PERIPHERAL POLYNEUROPATHY: ICD-10-CM

## 2022-07-11 RX ORDER — TRAMADOL HYDROCHLORIDE 50 MG/1
TABLET ORAL
Qty: 90 TABLET | Refills: 0 | Status: SHIPPED | OUTPATIENT
Start: 2022-07-11 | End: 2022-08-11

## 2022-07-11 RX ORDER — GABAPENTIN 600 MG/1
TABLET ORAL
Qty: 90 TABLET | Refills: 0 | Status: SHIPPED | OUTPATIENT
Start: 2022-07-11 | End: 2022-08-11

## 2022-07-11 RX ORDER — LISINOPRIL 20 MG/1
TABLET ORAL
Qty: 90 TABLET | Refills: 0 | Status: SHIPPED | OUTPATIENT
Start: 2022-07-11 | End: 2022-09-19

## 2022-07-18 ENCOUNTER — OFFICE VISIT (OUTPATIENT)
Dept: INTERNAL MEDICINE | Facility: CLINIC | Age: 87
End: 2022-07-18

## 2022-07-18 VITALS
TEMPERATURE: 97.3 F | BODY MASS INDEX: 25.08 KG/M2 | HEIGHT: 59 IN | WEIGHT: 124.4 LBS | SYSTOLIC BLOOD PRESSURE: 118 MMHG | DIASTOLIC BLOOD PRESSURE: 63 MMHG | OXYGEN SATURATION: 93 % | HEART RATE: 56 BPM

## 2022-07-18 DIAGNOSIS — B02.22 TRIGEMINAL HERPES ZOSTER: ICD-10-CM

## 2022-07-18 DIAGNOSIS — G62.9 PERIPHERAL POLYNEUROPATHY: ICD-10-CM

## 2022-07-18 DIAGNOSIS — M47.892 OTHER OSTEOARTHRITIS OF SPINE, CERVICAL REGION: ICD-10-CM

## 2022-07-18 DIAGNOSIS — E55.9 VITAMIN D DEFICIENCY: ICD-10-CM

## 2022-07-18 DIAGNOSIS — I10 HYPERTENSION, ESSENTIAL: Primary | ICD-10-CM

## 2022-07-18 PROCEDURE — 99214 OFFICE O/P EST MOD 30 MIN: CPT | Performed by: INTERNAL MEDICINE

## 2022-07-18 NOTE — PROGRESS NOTES
"Chief Complaint/ HPI: Patient is here to follow-up with gait dysfunction pain arthralgias myalgias, says her previous shingles still itches, she is here to follow-up with blood pressure recent lab studies, here with her daughter,          Objective   Vital Signs  Vitals:    07/18/22 1147   BP: 118/63   Pulse: 56   Temp: 97.3 °F (36.3 °C)   SpO2: 93%   Weight: 56.4 kg (124 lb 6.4 oz)   Height: 149.9 cm (59.02\")      Body mass index is 25.11 kg/m².  Review of Systems   Constitutional: Negative.    HENT: Negative.    Eyes: Negative.    Respiratory: Negative.    Cardiovascular: Negative.    Gastrointestinal: Negative.    Endocrine: Negative.    Genitourinary: Negative.    Musculoskeletal: Positive for arthralgias, gait problem, joint swelling and myalgias.   Allergic/Immunologic: Negative.    Hematological: Negative.    Psychiatric/Behavioral: Negative.       Physical Exam  Constitutional:       General: She is not in acute distress.     Appearance: Normal appearance.   HENT:      Head: Normocephalic.      Mouth/Throat:      Mouth: Mucous membranes are moist.   Eyes:      Conjunctiva/sclera: Conjunctivae normal.      Pupils: Pupils are equal, round, and reactive to light.   Cardiovascular:      Rate and Rhythm: Normal rate and regular rhythm.      Pulses: Normal pulses.      Heart sounds: Normal heart sounds.   Pulmonary:      Effort: Pulmonary effort is normal.      Breath sounds: Normal breath sounds.   Abdominal:      General: Abdomen is flat. Bowel sounds are normal.      Palpations: Abdomen is soft.   Musculoskeletal:         General: No swelling. Normal range of motion.      Cervical back: Neck supple.   Skin:     General: Skin is warm and dry.      Coloration: Skin is not jaundiced.   Neurological:      General: No focal deficit present.      Mental Status: She is alert and oriented to person, place, and time. Mental status is at baseline.   Psychiatric:         Mood and Affect: Mood normal.         Behavior: " October 23, 2019     Patient: Hudson Tse   YOB: 1977   Date of Visit: 10/23/2019       To Whom it May Concern:    Hudson Tse was seen in my clinic on 10/23/2019 at 3:45 pm.    Please excuse Hudson for his absence from work on the date listed above to be able to make his appointment.    Sincerely,         Jocelyne Perrin PA-C    Medical information is confidential and cannot be disclosed without the written consent of the patient or his representative.       Behavior normal.         Thought Content: Thought content normal.         Judgment: Judgment normal.        Result Review :   No results found for: PROBNP, BNP  CMP    CMP 12/28/21 7/5/22   Glucose 84 80   BUN 11 18   Creatinine 0.68 0.82   eGFR Non African Am 82    Sodium 141 139   Potassium 4.5 4.6   Chloride 104 104   Calcium 9.5 9.5   Albumin 4.00 4.00   Total Bilirubin 0.4 0.3   Alkaline Phosphatase 76 68   AST (SGOT) 17 21   ALT (SGPT) 11 15           CBC w/diff    CBC w/Diff 12/28/21 7/5/22   WBC 6.76 5.99   RBC 4.22 4.39   Hemoglobin 12.3 12.8   Hematocrit 37.7 39.7   MCV 89.3 90.4   MCH 29.1 29.2   MCHC 32.6 32.2   RDW 12.7 13.2   Platelets 265 287   Neutrophil Rel % 56.8 46.9   Immature Granulocyte Rel % 0.3 0.3   Lymphocyte Rel % 28.4 33.9   Monocyte Rel % 12.1 (A) 13.9 (A)   Eosinophil Rel % 1.8 4.3   Basophil Rel % 0.6 0.7   (A) Abnormal value             Lipid Panel    Lipid Panel 7/5/22   Total Cholesterol 170   Triglycerides 112   HDL Cholesterol 57   VLDL Cholesterol 20   LDL Cholesterol  93   LDL/HDL Ratio 1.59            Lab Results   Component Value Date    TSH 3.640 06/17/2021    TSH 2.750 11/12/2020    TSH 5.800 (H) 09/24/2019      Lab Results   Component Value Date    FREET4 1.2 11/12/2020                          Visit Diagnoses:    ICD-10-CM ICD-9-CM   1. Hypertension, essential  I10 401.9   2. Other osteoarthritis of spine, cervical region  M47.892 721.0   3. Vitamin D deficiency  E55.9 268.9   4. Peripheral polyneuropathy  G62.9 356.9   5. Trigeminal herpes zoster  B02.22 053.12       Assessment and Plan   Diagnoses and all orders for this visit:    1. Hypertension, essential (Primary)  -     Comprehensive Metabolic Panel; Future  -     CBC & Differential; Future  -     Vitamin D 25 Hydroxy; Future    2. Other osteoarthritis of spine, cervical region  -     Comprehensive Metabolic Panel; Future  -     CBC & Differential; Future  -     Vitamin D 25 Hydroxy; Future    3. Vitamin D  deficiency  -     Comprehensive Metabolic Panel; Future  -     CBC & Differential; Future  -     Vitamin D 25 Hydroxy; Future    4. Peripheral polyneuropathy  -     Comprehensive Metabolic Panel; Future  -     CBC & Differential; Future  -     Vitamin D 25 Hydroxy; Future    5. Trigeminal herpes zoster  -     Comprehensive Metabolic Panel; Future  -     CBC & Differential; Future  -     Vitamin D 25 Hydroxy; Future        Herpes zoster, trigeminal branch mandibular branch left cheek, previous treatments with gabapentin acyclovir pain medications, going back to May 2020, somewhat improved as of January 2022,, stable July 2022    Osteoarthritis of the neck of neuropathy, left neck mainly, currently on gabapentin, tramadol, continues    Vitamin D deficiency    Hypertension continues on lisinopril 20 mg daily, Toprol-XL 25 mg daily    Chronic pain syndrome chronic right hip pain low back pain---cont  on tramadol 3 times daily gabapentin as above    Previous cataract surgery    Glaucoma    Previous appendectomy, previous right total hip arthroplasty 2000    spinal stenosis    History of kidney stone with UTI and sepsis December 2017 in the hospital                Follow Up   Return in about 6 months (around 1/18/2023).  Patient was given instructions and counseling regarding her condition or for health maintenance advice. Please see specific information pulled into the AVS if appropriate.

## 2022-08-11 DIAGNOSIS — G62.9 PERIPHERAL POLYNEUROPATHY: ICD-10-CM

## 2022-08-11 RX ORDER — TRAMADOL HYDROCHLORIDE 50 MG/1
TABLET ORAL
Qty: 90 TABLET | Refills: 0 | Status: SHIPPED | OUTPATIENT
Start: 2022-08-11 | End: 2022-09-12

## 2022-08-11 RX ORDER — GABAPENTIN 600 MG/1
TABLET ORAL
Qty: 90 TABLET | Refills: 0 | Status: SHIPPED | OUTPATIENT
Start: 2022-08-11 | End: 2022-09-12

## 2022-09-12 DIAGNOSIS — G62.9 PERIPHERAL POLYNEUROPATHY: ICD-10-CM

## 2022-09-12 RX ORDER — TRAMADOL HYDROCHLORIDE 50 MG/1
TABLET ORAL
Qty: 90 TABLET | Refills: 0 | Status: SHIPPED | OUTPATIENT
Start: 2022-09-12 | End: 2022-10-10

## 2022-09-12 RX ORDER — GABAPENTIN 600 MG/1
TABLET ORAL
Qty: 90 TABLET | Refills: 0 | Status: SHIPPED | OUTPATIENT
Start: 2022-09-12 | End: 2022-10-10

## 2022-09-19 RX ORDER — LISINOPRIL 20 MG/1
TABLET ORAL
Qty: 90 TABLET | Refills: 0 | Status: SHIPPED | OUTPATIENT
Start: 2022-09-19 | End: 2023-01-04 | Stop reason: SDUPTHER

## 2022-10-10 DIAGNOSIS — G62.9 PERIPHERAL POLYNEUROPATHY: ICD-10-CM

## 2022-10-10 RX ORDER — GABAPENTIN 600 MG/1
TABLET ORAL
Qty: 90 TABLET | Refills: 0 | Status: SHIPPED | OUTPATIENT
Start: 2022-10-10 | End: 2022-11-08

## 2022-10-10 RX ORDER — TRAMADOL HYDROCHLORIDE 50 MG/1
TABLET ORAL
Qty: 90 TABLET | Refills: 0 | Status: SHIPPED | OUTPATIENT
Start: 2022-10-10 | End: 2022-11-08

## 2022-11-08 DIAGNOSIS — G62.9 PERIPHERAL POLYNEUROPATHY: ICD-10-CM

## 2022-11-08 RX ORDER — GABAPENTIN 600 MG/1
TABLET ORAL
Qty: 90 TABLET | Refills: 0 | Status: SHIPPED | OUTPATIENT
Start: 2022-11-08 | End: 2022-12-08 | Stop reason: SDUPTHER

## 2022-11-08 RX ORDER — TRAMADOL HYDROCHLORIDE 50 MG/1
50 TABLET ORAL 3 TIMES DAILY PRN
Qty: 90 TABLET | Refills: 0 | Status: SHIPPED | OUTPATIENT
Start: 2022-11-08 | End: 2022-12-08

## 2022-12-02 DIAGNOSIS — G62.9 PERIPHERAL POLYNEUROPATHY: Primary | ICD-10-CM

## 2022-12-05 RX ORDER — GABAPENTIN 100 MG/1
CAPSULE ORAL
Qty: 180 CAPSULE | Refills: 0 | Status: SHIPPED | OUTPATIENT
Start: 2022-12-05 | End: 2022-12-08

## 2022-12-08 DIAGNOSIS — G62.9 PERIPHERAL POLYNEUROPATHY: ICD-10-CM

## 2022-12-08 RX ORDER — GABAPENTIN 600 MG/1
600 TABLET ORAL 3 TIMES DAILY
Qty: 90 TABLET | Refills: 0 | Status: SHIPPED | OUTPATIENT
Start: 2022-12-08 | End: 2023-01-13 | Stop reason: SDUPTHER

## 2022-12-08 RX ORDER — TRAMADOL HYDROCHLORIDE 50 MG/1
TABLET ORAL
Qty: 90 TABLET | Refills: 5 | Status: SHIPPED | OUTPATIENT
Start: 2022-12-08 | End: 2023-01-18 | Stop reason: SDUPTHER

## 2022-12-12 ENCOUNTER — TELEPHONE (OUTPATIENT)
Dept: INTERNAL MEDICINE | Facility: CLINIC | Age: 87
End: 2022-12-12

## 2022-12-12 NOTE — TELEPHONE ENCOUNTER
Caller: GIGI ZULETA    Relationship to patient: Emergency Contact    Best call back number: [phone]    Patient is needing: GIGI CALLED TO UPDATE INSURANCE INFORMATION FOR THE PRIOR AUTHORIZATION. IT IS NOW [employer]VANTAGE.

## 2022-12-12 NOTE — TELEPHONE ENCOUNTER
Caller: GIGI ZULETA    Relationship: Emergency Contact    Best call back number: [phone]    What is the best time to reach you: ANYTIME    Who are you requesting to speak with (clinical staff, provider,  specific staff member): NURSE         What was the call regarding: PATIENT' S DAUGHTER IS CALLING STATING THAT THE PHARMACY NEEDS A PA ON TRAMADOL.     Do you require a callback: PLEASE ADVISE WHEN THIS HAS BEEN TAKEN CARE OF AND THEY CAN  REFILL.

## 2022-12-20 DIAGNOSIS — G62.9 PERIPHERAL POLYNEUROPATHY: ICD-10-CM

## 2022-12-20 RX ORDER — TRAMADOL HYDROCHLORIDE 50 MG/1
50 TABLET ORAL 3 TIMES DAILY PRN
Qty: 90 TABLET | Refills: 5 | OUTPATIENT
Start: 2022-12-20

## 2022-12-20 NOTE — TELEPHONE ENCOUNTER
Caller: ROSHAN    Relationship: Child    Best call back number: [phone]    Requested Prescriptions:   Requested Prescriptions     Pending Prescriptions Disp Refills   • traMADol (ULTRAM) 50 MG tablet 90 tablet 5     Sig: Take 1 tablet by mouth 3 (Three) Times a Day As Needed. for pain        Pharmacy where request should be sent: [address] [phone] [facility] [phone] FX       Does the patient have less than a 3 day supply:  [] Yes  [x] No    Would you like a call back once the refill request has been completed: [x] Yes [] No    If the office needs to give you a call back, can they leave a voicemail: [x] Yes [] No    Alfie Medina Rep   12/20/22 13:05 EST

## 2023-01-04 ENCOUNTER — TELEPHONE (OUTPATIENT)
Dept: INTERNAL MEDICINE | Facility: CLINIC | Age: 88
End: 2023-01-04

## 2023-01-04 RX ORDER — LISINOPRIL 20 MG/1
20 TABLET ORAL DAILY
Qty: 90 TABLET | Refills: 0 | Status: SHIPPED | OUTPATIENT
Start: 2023-01-04 | End: 2023-01-09

## 2023-01-04 NOTE — TELEPHONE ENCOUNTER
Caller: ROSHAN    Relationship: DAUGHTER  Best call back number: [phone]    Requested Prescriptions:   Requested Prescriptions     Pending Prescriptions Disp Refills   • lisinopril (PRINIVIL,ZESTRIL) 20 MG tablet 90 tablet 0     Sig: Take 1 tablet by mouth Daily.        Pharmacy where request should be sent: [facility] [address] [place] [address] [phone] [facility] [phone] FX     Does the patient have less than a 3 day supply:  [] Yes  [x] No    Would you like a call back once the refill request has been completed: [x] Yes [] No    If the office needs to give you a call back, can they leave a voicemail: [x] Yes [] No    Alfie Saenz Rep   01/04/23 11:59 EST

## 2023-01-04 NOTE — TELEPHONE ENCOUNTER
Hub staff attempted to follow warm transfer process and was unsuccessful     Caller: ROSHAN PIERRE    Relationship to patient:     Best call back number: [phone]    Patient is needing: PATIENT'S DAUGHTER IS RETURNING [facility]'S CALL.

## 2023-01-04 NOTE — TELEPHONE ENCOUNTER
Caller: ROSHAN PIERRE    Relationship:     Best call back number: [phone]    What orders are you requesting (i.e. lab or imaging): LAB ORDERS    In what timeframe would the patient need to come in: ASAP, APPOINTMENT ON 1/18/23    Where will you receive your lab/imaging services: [facility]

## 2023-01-09 RX ORDER — LISINOPRIL 20 MG/1
TABLET ORAL
Qty: 90 TABLET | Refills: 0 | Status: SHIPPED | OUTPATIENT
Start: 2023-01-09 | End: 2023-01-18 | Stop reason: SDUPTHER

## 2023-01-10 ENCOUNTER — LAB (OUTPATIENT)
Dept: LAB | Facility: HOSPITAL | Age: 88
End: 2023-01-10
Payer: MEDICARE

## 2023-01-10 DIAGNOSIS — G62.9 PERIPHERAL POLYNEUROPATHY: ICD-10-CM

## 2023-01-10 DIAGNOSIS — B02.22 TRIGEMINAL HERPES ZOSTER: ICD-10-CM

## 2023-01-10 DIAGNOSIS — E55.9 VITAMIN D DEFICIENCY: ICD-10-CM

## 2023-01-10 DIAGNOSIS — M47.892 OTHER OSTEOARTHRITIS OF SPINE, CERVICAL REGION: ICD-10-CM

## 2023-01-10 DIAGNOSIS — I10 HYPERTENSION, ESSENTIAL: ICD-10-CM

## 2023-01-10 PROCEDURE — 85025 COMPLETE CBC W/AUTO DIFF WBC: CPT

## 2023-01-10 PROCEDURE — 80053 COMPREHEN METABOLIC PANEL: CPT

## 2023-01-10 PROCEDURE — 36415 COLL VENOUS BLD VENIPUNCTURE: CPT

## 2023-01-10 PROCEDURE — 82306 VITAMIN D 25 HYDROXY: CPT

## 2023-01-11 LAB
25(OH)D3 SERPL-MCNC: 27.6 NG/ML (ref 30–100)
ALBUMIN SERPL-MCNC: 3.9 G/DL (ref 3.5–5.2)
ALBUMIN/GLOB SERPL: 1.4 G/DL
ALP SERPL-CCNC: 69 U/L (ref 39–117)
ALT SERPL W P-5'-P-CCNC: 12 U/L (ref 1–33)
ANION GAP SERPL CALCULATED.3IONS-SCNC: 9.5 MMOL/L (ref 5–15)
AST SERPL-CCNC: 26 U/L (ref 1–32)
BASOPHILS # BLD AUTO: 0.04 10*3/MM3 (ref 0–0.2)
BASOPHILS NFR BLD AUTO: 0.6 % (ref 0–1.5)
BILIRUB SERPL-MCNC: <0.2 MG/DL (ref 0–1.2)
BUN SERPL-MCNC: 14 MG/DL (ref 8–23)
BUN/CREAT SERPL: 20 (ref 7–25)
CALCIUM SPEC-SCNC: 9.4 MG/DL (ref 8.6–10.5)
CHLORIDE SERPL-SCNC: 104 MMOL/L (ref 98–107)
CO2 SERPL-SCNC: 27.5 MMOL/L (ref 22–29)
CREAT SERPL-MCNC: 0.7 MG/DL (ref 0.57–1)
DEPRECATED RDW RBC AUTO: 38.8 FL (ref 37–54)
EGFRCR SERPLBLD CKD-EPI 2021: 82.8 ML/MIN/1.73
EOSINOPHIL # BLD AUTO: 0.15 10*3/MM3 (ref 0–0.4)
EOSINOPHIL NFR BLD AUTO: 2.2 % (ref 0.3–6.2)
ERYTHROCYTE [DISTWIDTH] IN BLOOD BY AUTOMATED COUNT: 12.5 % (ref 12.3–15.4)
GLOBULIN UR ELPH-MCNC: 2.7 GM/DL
GLUCOSE SERPL-MCNC: 86 MG/DL (ref 65–99)
HCT VFR BLD AUTO: 37.9 % (ref 34–46.6)
HGB BLD-MCNC: 13 G/DL (ref 12–15.9)
IMM GRANULOCYTES # BLD AUTO: 0.02 10*3/MM3 (ref 0–0.05)
IMM GRANULOCYTES NFR BLD AUTO: 0.3 % (ref 0–0.5)
LYMPHOCYTES # BLD AUTO: 2.2 10*3/MM3 (ref 0.7–3.1)
LYMPHOCYTES NFR BLD AUTO: 32.1 % (ref 19.6–45.3)
MCH RBC QN AUTO: 29.2 PG (ref 26.6–33)
MCHC RBC AUTO-ENTMCNC: 34.3 G/DL (ref 31.5–35.7)
MCV RBC AUTO: 85.2 FL (ref 79–97)
MONOCYTES # BLD AUTO: 0.75 10*3/MM3 (ref 0.1–0.9)
MONOCYTES NFR BLD AUTO: 10.9 % (ref 5–12)
NEUTROPHILS NFR BLD AUTO: 3.7 10*3/MM3 (ref 1.7–7)
NEUTROPHILS NFR BLD AUTO: 53.9 % (ref 42.7–76)
NRBC BLD AUTO-RTO: 0 /100 WBC (ref 0–0.2)
PLATELET # BLD AUTO: 325 10*3/MM3 (ref 140–450)
PMV BLD AUTO: 10 FL (ref 6–12)
POTASSIUM SERPL-SCNC: 4.5 MMOL/L (ref 3.5–5.2)
PROT SERPL-MCNC: 6.6 G/DL (ref 6–8.5)
RBC # BLD AUTO: 4.45 10*6/MM3 (ref 3.77–5.28)
SODIUM SERPL-SCNC: 141 MMOL/L (ref 136–145)
WBC NRBC COR # BLD: 6.86 10*3/MM3 (ref 3.4–10.8)

## 2023-01-13 DIAGNOSIS — G62.9 PERIPHERAL POLYNEUROPATHY: ICD-10-CM

## 2023-01-13 RX ORDER — GABAPENTIN 600 MG/1
600 TABLET ORAL 3 TIMES DAILY
Qty: 90 TABLET | Refills: 0 | Status: SHIPPED | OUTPATIENT
Start: 2023-01-13 | End: 2023-01-18 | Stop reason: SDUPTHER

## 2023-01-13 NOTE — TELEPHONE ENCOUNTER
Caller: Liudmila Liu    Relationship: Self    Best call back number: [phone]    Requested Prescriptions:   Requested Prescriptions     Pending Prescriptions Disp Refills   • gabapentin (NEURONTIN) 600 MG tablet 90 tablet 0     Sig: Take 1 tablet by mouth 3 (Three) Times a Day.        Pharmacy where request should be sent: [facility] - [address] [phone] [facility] [phone]      Additional details provided by patient: Four left on hand     Does the patient have less than a 3 day supply:  [] Yes  [x] No    Would you like a call back once the refill request has been completed: [x] Yes [] No    If the office needs to give you a call back, can they leave a voicemail: [x] Yes [] No    Alfie Lorenz Rep   01/13/23 11:59 EST

## 2023-01-13 NOTE — TELEPHONE ENCOUNTER
Patient will be continue to use [facility] - [place], [place] - [address] [place] [address] - [phone] [facility] [phone]   [phone], going further for all medication.

## 2023-01-18 ENCOUNTER — OFFICE VISIT (OUTPATIENT)
Dept: INTERNAL MEDICINE | Facility: CLINIC | Age: 88
End: 2023-01-18
Payer: MEDICARE

## 2023-01-18 VITALS
OXYGEN SATURATION: 95 % | TEMPERATURE: 98.2 F | SYSTOLIC BLOOD PRESSURE: 120 MMHG | WEIGHT: 120.6 LBS | HEART RATE: 55 BPM | HEIGHT: 59 IN | BODY MASS INDEX: 24.31 KG/M2 | DIASTOLIC BLOOD PRESSURE: 76 MMHG

## 2023-01-18 DIAGNOSIS — I10 HYPERTENSION, ESSENTIAL: Primary | ICD-10-CM

## 2023-01-18 DIAGNOSIS — M47.892 OTHER OSTEOARTHRITIS OF SPINE, CERVICAL REGION: ICD-10-CM

## 2023-01-18 DIAGNOSIS — E55.9 VITAMIN D DEFICIENCY: ICD-10-CM

## 2023-01-18 DIAGNOSIS — B02.22 TRIGEMINAL HERPES ZOSTER: ICD-10-CM

## 2023-01-18 DIAGNOSIS — G62.9 PERIPHERAL POLYNEUROPATHY: ICD-10-CM

## 2023-01-18 PROCEDURE — 99214 OFFICE O/P EST MOD 30 MIN: CPT | Performed by: INTERNAL MEDICINE

## 2023-01-18 RX ORDER — TRAMADOL HYDROCHLORIDE 50 MG/1
50 TABLET ORAL 3 TIMES DAILY PRN
Qty: 90 TABLET | Refills: 5 | Status: SHIPPED | OUTPATIENT
Start: 2023-01-18

## 2023-01-18 RX ORDER — LISINOPRIL 20 MG/1
20 TABLET ORAL DAILY
Qty: 90 TABLET | Refills: 3 | Status: SHIPPED | OUTPATIENT
Start: 2023-01-18

## 2023-01-18 RX ORDER — GABAPENTIN 600 MG/1
600 TABLET ORAL 3 TIMES DAILY
Qty: 30 TABLET | Refills: 3 | Status: SHIPPED | OUTPATIENT
Start: 2023-01-18 | End: 2023-02-08

## 2023-01-18 RX ORDER — PANTOPRAZOLE SODIUM 40 MG/1
40 TABLET, DELAYED RELEASE ORAL DAILY
Qty: 90 TABLET | Refills: 3 | Status: SHIPPED | OUTPATIENT
Start: 2023-01-18

## 2023-01-18 NOTE — PROGRESS NOTES
"CHIEF COMPLAINT:  Hypertension and Follow-up      HPI: Patient is here to follow-up with neuropathy, lab work, says she is doing fairly well, she lost her  in October    follow-up with gait dysfunction pain arthralgias myalgias, says her previous shingles still itches, she is here to follow-up with blood pressure recent lab studies, here with her daughter,      Objective   Vital Signs  Vitals:    01/18/23 1312   BP: 120/76   Pulse: 55   Temp: 98.2 °F (36.8 °C)   SpO2: 95%   Weight: 54.7 kg (120 lb 9.6 oz)   Height: 149.9 cm (59.02\")      Body mass index is 24.35 kg/m².  Review of Systems   Constitutional: Positive for fatigue.   HENT: Negative.    Eyes: Negative.    Respiratory: Negative.    Cardiovascular: Negative.    Gastrointestinal: Negative.    Endocrine: Negative.    Genitourinary: Negative.    Musculoskeletal: Negative.    Allergic/Immunologic: Negative.    Neurological: Positive for weakness.   Hematological: Negative.    Psychiatric/Behavioral: Negative.       Physical Exam  Constitutional:       General: She is not in acute distress.     Appearance: Normal appearance.   HENT:      Head: Normocephalic.      Mouth/Throat:      Mouth: Mucous membranes are moist.   Eyes:      Conjunctiva/sclera: Conjunctivae normal.      Pupils: Pupils are equal, round, and reactive to light.   Cardiovascular:      Rate and Rhythm: Normal rate and regular rhythm.      Pulses: Normal pulses.      Heart sounds: Normal heart sounds.   Pulmonary:      Effort: Pulmonary effort is normal.      Breath sounds: Normal breath sounds.   Abdominal:      General: Abdomen is flat. Bowel sounds are normal.      Palpations: Abdomen is soft.   Musculoskeletal:         General: No swelling. Normal range of motion.      Cervical back: Neck supple.   Skin:     General: Skin is warm and dry.      Coloration: Skin is not jaundiced.   Neurological:      Mental Status: She is alert and oriented to person, place, and time. Mental status is at " baseline.   Psychiatric:         Mood and Affect: Mood normal.         Behavior: Behavior normal.         Thought Content: Thought content normal.         Judgment: Judgment normal.        Result Review :   No results found for: PROBNP, BNP  CMP    CMP 7/5/22 1/10/23   Glucose 80 86   BUN 18 14   Creatinine 0.82 0.70   eGFR 68.9 82.8   Sodium 139 141   Potassium 4.6 4.5   Chloride 104 104   Calcium 9.5 9.4   Total Protein 6.4 6.6   Albumin 4.00 3.9   Globulin 2.4 2.7   Total Bilirubin 0.3 <0.2   Alkaline Phosphatase 68 69   AST (SGOT) 21 26   ALT (SGPT) 15 12   Albumin/Globulin Ratio 1.7 1.4   BUN/Creatinine Ratio 22.0 20.0   Anion Gap 9.0 9.5      Comments are available for some flowsheets but are not being displayed.           CBC w/diff    CBC w/Diff 7/5/22 1/10/23   WBC 5.99 6.86   RBC 4.39 4.45   Hemoglobin 12.8 13.0   Hematocrit 39.7 37.9   MCV 90.4 85.2   MCH 29.2 29.2   MCHC 32.2 34.3   RDW 13.2 12.5   Platelets 287 325   Neutrophil Rel % 46.9 53.9   Immature Granulocyte Rel % 0.3 0.3   Lymphocyte Rel % 33.9 32.1   Monocyte Rel % 13.9 (A) 10.9   Eosinophil Rel % 4.3 2.2   Basophil Rel % 0.7 0.6   (A) Abnormal value             Lipid Panel    Lipid Panel 7/5/22   Total Cholesterol 170   Triglycerides 112   HDL Cholesterol 57   VLDL Cholesterol 20   LDL Cholesterol  93   LDL/HDL Ratio 1.59            Lab Results   Component Value Date    TSH 3.640 06/17/2021    TSH 2.750 11/12/2020    TSH 5.800 (H) 09/24/2019      Lab Results   Component Value Date    FREET4 1.2 11/12/2020                          Visit Diagnoses:    ICD-10-CM ICD-9-CM   1. Hypertension, essential  I10 401.9   2. Peripheral polyneuropathy  G62.9 356.9   3. Other osteoarthritis of spine, cervical region  M47.892 721.0   4. Vitamin D deficiency  E55.9 268.9   5. Trigeminal herpes zoster  B02.22 053.12       Assessment and Plan   Diagnoses and all orders for this visit:    1. Hypertension, essential (Primary)  -     CBC & Differential; Future  -      Comprehensive Metabolic Panel; Future    2. Peripheral polyneuropathy  -     gabapentin (NEURONTIN) 600 MG tablet; Take 1 tablet by mouth 3 (Three) Times a Day for 10 days.  Dispense: 30 tablet; Refill: 3  -     traMADol (ULTRAM) 50 MG tablet; Take 1 tablet by mouth 3 (Three) Times a Day As Needed for Moderate Pain. for pain  Dispense: 90 tablet; Refill: 5  -     CBC & Differential; Future  -     Comprehensive Metabolic Panel; Future    3. Other osteoarthritis of spine, cervical region  -     CBC & Differential; Future  -     Comprehensive Metabolic Panel; Future    4. Vitamin D deficiency  -     CBC & Differential; Future  -     Comprehensive Metabolic Panel; Future    5. Trigeminal herpes zoster  -     CBC & Differential; Future  -     Comprehensive Metabolic Panel; Future    Other orders  -     pantoprazole (PROTONIX) 40 MG EC tablet; Take 1 tablet by mouth Daily.  Dispense: 90 tablet; Refill: 3  -     lisinopril (PRINIVIL,ZESTRIL) 20 MG tablet; Take 1 tablet by mouth Daily.  Dispense: 90 tablet; Refill: 3  -     metoprolol tartrate (LOPRESSOR) 25 MG tablet; Take 1 tablet by mouth Daily.  Dispense: 90 tablet; Refill: 3    Herpes zoster, trigeminal branch mandibular branch left cheek, previous treatments with gabapentin acyclovir pain medications, going back to May 2020, improved as of January 2022,, stable July 2022, and January 2023    Osteoarthritis of the neck of neuropathy, left neck mainly, continues on gabapentin, tramadol, continues    Vitamin D deficiency    Hypertension continues on lisinopril 20 mg daily, Toprol-XL 25 mg daily    Chronic pain syndrome chronic right hip pain low back pain---cont  on tramadol 3 times daily, gabapentin as above    Previous cataract surgery    Glaucoma    appendectomy,  right total hip arthroplasty 2000    spinal stenosis    kidney stone with UTI and sepsis December 2017 in the hospital    Follow Up   Return in about 6 months (around 7/18/2023).  Patient was given  instructions and counseling regarding her condition or for health maintenance advice. Please see specific information pulled into the AVS if appropriate.

## 2023-02-07 DIAGNOSIS — G62.9 PERIPHERAL POLYNEUROPATHY: ICD-10-CM

## 2023-02-08 RX ORDER — GABAPENTIN 600 MG/1
TABLET ORAL
Qty: 90 TABLET | Refills: 0 | Status: SHIPPED | OUTPATIENT
Start: 2023-02-08 | End: 2023-03-20 | Stop reason: SDUPTHER

## 2023-02-27 DIAGNOSIS — G62.9 PERIPHERAL POLYNEUROPATHY: ICD-10-CM

## 2023-02-27 RX ORDER — GABAPENTIN 600 MG/1
TABLET ORAL
Qty: 90 TABLET | Refills: 0 | OUTPATIENT
Start: 2023-02-27

## 2023-03-20 DIAGNOSIS — G62.9 PERIPHERAL POLYNEUROPATHY: ICD-10-CM

## 2023-03-20 RX ORDER — GABAPENTIN 600 MG/1
600 TABLET ORAL 3 TIMES DAILY
Qty: 90 TABLET | Refills: 0 | Status: SHIPPED | OUTPATIENT
Start: 2023-03-20

## 2023-03-20 NOTE — TELEPHONE ENCOUNTER
Caller: GIGI ZULETA    Relationship: Emergency Contact    Best call back number: [phone]     Requested Prescriptions:   Requested Prescriptions     Pending Prescriptions Disp Refills   • gabapentin (NEURONTIN) 600 MG tablet 90 tablet 0     Sig: Take 1 tablet by mouth 3 (Three) Times a Day.        Pharmacy where request should be sent: [facility] [address] [phone] [facility] [phone] FX       Does the patient have less than a 3 day supply:  [] Yes  [x] No    Would you like a call back once the refill request has been completed: [x] Yes [] No    If the office needs to give you a call back, can they leave a voicemail: [x] Yes [] No    Alfie Smith Rep   03/20/23 11:31 EDT

## 2023-04-20 DIAGNOSIS — G62.9 PERIPHERAL POLYNEUROPATHY: ICD-10-CM

## 2023-04-20 RX ORDER — GABAPENTIN 600 MG/1
600 TABLET ORAL 3 TIMES DAILY
Qty: 360 TABLET | Refills: 1 | Status: SHIPPED | OUTPATIENT
Start: 2023-04-20

## 2023-07-18 PROBLEM — Z00.00 MEDICARE ANNUAL WELLNESS VISIT, SUBSEQUENT: Status: ACTIVE | Noted: 2023-07-18

## 2023-07-18 PROBLEM — L97.921 ULCER OF LEFT LOWER EXTREMITY, LIMITED TO BREAKDOWN OF SKIN: Status: ACTIVE | Noted: 2023-07-18

## 2023-08-03 ENCOUNTER — TELEPHONE (OUTPATIENT)
Dept: INTERNAL MEDICINE | Facility: CLINIC | Age: 88
End: 2023-08-03
Payer: MEDICARE

## 2023-08-03 DIAGNOSIS — G62.9 PERIPHERAL POLYNEUROPATHY: ICD-10-CM

## 2023-08-03 RX ORDER — TRAMADOL HYDROCHLORIDE 50 MG/1
50 TABLET ORAL 3 TIMES DAILY PRN
Qty: 90 TABLET | Refills: 5 | Status: SHIPPED | OUTPATIENT
Start: 2023-08-03

## 2023-08-07 ENCOUNTER — OFFICE VISIT (OUTPATIENT)
Dept: WOUND CARE | Facility: HOSPITAL | Age: 88
End: 2023-08-07
Payer: MEDICARE

## 2023-08-07 VITALS
RESPIRATION RATE: 16 BRPM | TEMPERATURE: 97 F | SYSTOLIC BLOOD PRESSURE: 90 MMHG | HEART RATE: 67 BPM | DIASTOLIC BLOOD PRESSURE: 58 MMHG

## 2023-08-07 DIAGNOSIS — G62.9 PERIPHERAL POLYNEUROPATHY: ICD-10-CM

## 2023-08-07 DIAGNOSIS — I87.2 VENOUS STASIS DERMATITIS OF LEFT LOWER EXTREMITY: Primary | ICD-10-CM

## 2023-08-07 DIAGNOSIS — L97.921 ULCER OF LEFT LOWER EXTREMITY, LIMITED TO BREAKDOWN OF SKIN: ICD-10-CM

## 2023-08-07 PROCEDURE — G0463 HOSPITAL OUTPT CLINIC VISIT: HCPCS | Performed by: EMERGENCY MEDICINE

## 2023-08-07 PROCEDURE — 1160F RVW MEDS BY RX/DR IN RCRD: CPT | Performed by: EMERGENCY MEDICINE

## 2023-08-07 PROCEDURE — 1159F MED LIST DOCD IN RCRD: CPT | Performed by: EMERGENCY MEDICINE

## 2023-08-07 RX ORDER — ALCLOMETASONE DIPROPIONATE 0.5 MG/G
1 CREAM TOPICAL 2 TIMES DAILY
COMMUNITY

## 2023-08-07 NOTE — PROGRESS NOTES
"Chief Complaint  Wound Check (NEW PT, WOUND TO LEFT LOWER LEG, WOUND PRESENT FOR APPROX. 2 YEARS, DERMATOLOGY RECOMMENDED TREATING WITH ALCLOMETASONE DIPROPIONATE OINTMENT, BUT PT STATES, \"THE SKIN DON'T DO RIGHT\" WHEN USING THE OINTMENT. PT STATES SHE USES GOLDBOND HEALING WITH ALOE LOTION AND COVERS WITH DIABETIC SOCK. ([id number]))    Subjective      History of Present Illness    Liudmila Liu  is a 89 y.o. female with a small wound and rash on the LLE for at least 2 years.  Patient was seen by Dr. Allan, dermatology, and told that it was not skin cancer and he was not concerned about it.  He prescribed alclometasone twice daily.  The patient only use this a couple times and said she did not like what it was doing to her skin so she stopped using it.  She felt like the skin was looking darker and that concerned her so she stopped.  The area is sometimes pruritic but does not cause her any pain.  There is some discoloration in the area as well as patchy dry flaky skin.      Allergies:  Sulfa antibiotics      Current Outpatient Medications:     alclometasone (ACLOVATE) 0.05 % cream, Apply 1 application  topically to the appropriate area as directed 2 (Two) Times a Day., Disp: , Rfl:     brimonidine-timolol (Combigan) 0.2-0.5 % ophthalmic solution, 1 drop., Disp: , Rfl:     gabapentin (NEURONTIN) 600 MG tablet, Take 1 tablet by mouth 3 (Three) Times a Day., Disp: 360 tablet, Rfl: 1    ketoconazole (NIZORAL) 2 % shampoo, Apply  topically to the appropriate area as directed 2 (Two) Times a Week., Disp: 240 mL, Rfl: 5    latanoprost (XALATAN) 0.005 % ophthalmic solution, latanoprost 0.005 % eye drops, Disp: , Rfl:     lisinopril (PRINIVIL,ZESTRIL) 20 MG tablet, Take 1 tablet by mouth Daily., Disp: 90 tablet, Rfl: 3    metoprolol tartrate (LOPRESSOR) 25 MG tablet, Take 1 tablet by mouth Daily., Disp: 90 tablet, Rfl: 3    pantoprazole (PROTONIX) 40 MG EC tablet, Take 1 tablet by mouth Daily., Disp: 90 " tablet, Rfl: 3    traMADol (ULTRAM) 50 MG tablet, Take 1 tablet by mouth 3 (Three) Times a Day As Needed for Moderate Pain. for pain, Disp: 90 tablet, Rfl: 5    Past Medical History:   Diagnosis Date    Age-related osteoporosis without current pathological fracture     Congestive heart failure (CHF)     UNSPECIFIED, HF CHRONICITY, UNSPECIFIED HEART FAILURE TYPE    Difficulty in walking, not elsewhere classified     Diverticulitis     Essential (primary) hypertension     Gastric ulcer 01/2020    Heart failure, unspecified     Hip pain, right     Hypothyroidism, unspecified     OA (osteoarthritis)     Osteoporosis     Spinal stenosis     Vitamin D deficiency, unspecified      Past Surgical History:   Procedure Laterality Date    APPENDECTOMY      CATARACT EXTRACTION      KIDNEY STONE SURGERY  01/2018    TOTAL HIP ARTHROPLASTY REVISION Right 2000    WISDOM TOOTH EXTRACTION  2020    UPPER     Social History     Socioeconomic History    Marital status:    Tobacco Use    Smoking status: Never    Smokeless tobacco: Never   Vaping Use    Vaping Use: Never used   Substance and Sexual Activity    Alcohol use: Never    Drug use: Never    Sexual activity: Defer           Objective     Vitals:    08/07/23 1332   BP: 90/58   BP Location: Left arm   Patient Position: Sitting   Pulse: 67   Resp: 16   Temp: 97 øF (36.1 øC)   TempSrc: Temporal   PainSc: 0-No pain     There is no height or weight on file to calculate BMI.    STEADI Fall Risk Assessment was completed, and patient is at LOW risk for falls.Assessment completed on:1/18/2023     Review of Systems     ROS:  Per HPI.     I have reviewed the HPI and ROS as documented by MA/RN. Jessica De Leon MD    Physical Exam     NAD  Pleasant, cooperative  Varicose veins diffusely distal BLE  Patchy discoloration and dry skin medial mid distal LLE.  Small shallow ulceration proximally.  There is a small excoriation anterior shin that is unrelated and appears to be healing.  No  tenderness or evidence of infection.  Palpable pedal pulses.  No wounds RLE.      See photos for details.    LLE:        Result Review :  The following data was reviewed by: Jessica De Leon MD on 08/07/2023:    PCP notes, CBC, CMP             Assessment and Plan   Diagnoses and all orders for this visit:    1. Venous stasis dermatitis of left lower extremity (Primary)    2. Ulcer of left lower extremity, limited to breakdown of skin    3. Peripheral polyneuropathy      Patient with chronic rash and intermittent shallow ulceration LLE.  Recommend alclometasone twice daily as prescribed by Dr. Allan.  Patient can apply that morning and evening and use her moisturizing ointment in the middle of the day if she wishes.  I do recommend that she wrap her lower extremity and elevate it when seated.    Recheck 4 weeks.    Patient was given instructions and counseling regarding their condition or for health maintenance advice, as well as the wound care plan and recommendations. They understand and agree with the plan.  They will follow back up here in the clinic but are instructed to contact us in the interim should they have any new, returning, or worsening symptoms or concerns. Please see specific information pulled into the AVS if appropriate.     Dragon Dictation utilized for chart completion.    Follow Up   Return in about 4 weeks (around 9/4/2023).      Jessica De Leon MD

## 2023-09-07 ENCOUNTER — OFFICE VISIT (OUTPATIENT)
Dept: WOUND CARE | Facility: HOSPITAL | Age: 88
End: 2023-09-07
Payer: MEDICARE

## 2023-09-07 VITALS
HEART RATE: 66 BPM | DIASTOLIC BLOOD PRESSURE: 76 MMHG | RESPIRATION RATE: 16 BRPM | TEMPERATURE: 97.8 F | SYSTOLIC BLOOD PRESSURE: 118 MMHG

## 2023-09-07 DIAGNOSIS — I80.292: ICD-10-CM

## 2023-09-07 DIAGNOSIS — I87.2 VENOUS STASIS DERMATITIS OF LEFT LOWER EXTREMITY: ICD-10-CM

## 2023-09-07 DIAGNOSIS — L97.921 ULCER OF LEFT LOWER EXTREMITY, LIMITED TO BREAKDOWN OF SKIN: Primary | ICD-10-CM

## 2023-09-07 DIAGNOSIS — G62.9 PERIPHERAL POLYNEUROPATHY: ICD-10-CM

## 2023-09-07 PROCEDURE — 1160F RVW MEDS BY RX/DR IN RCRD: CPT | Performed by: EMERGENCY MEDICINE

## 2023-09-07 PROCEDURE — G0463 HOSPITAL OUTPT CLINIC VISIT: HCPCS | Performed by: EMERGENCY MEDICINE

## 2023-09-07 PROCEDURE — 1159F MED LIST DOCD IN RCRD: CPT | Performed by: EMERGENCY MEDICINE

## 2023-09-07 NOTE — PROGRESS NOTES
Chief Complaint  Wound Check (FOLLOW-UP ON LEFT LEG ULCER)    Subjective      History of Present Illness    Liudmila Liu  is a 89 y.o. female with a small wound and rash on the LLE for at least 2 years.  Patient was seen by Dr. Allan, dermatology, and told that it was not skin cancer and he was not concerned about it.  He prescribed alclometasone twice daily.  The patient initially only used this a couple times and said she did not like what it was doing to her skin so she stopped using it.  However, after her initial [facility] visit she began using the steroid ointment more consistently once or twice a day for the last month.  She has been mixing it with Aquaphor according to her daughter.  Patient feels like the area has improved.    Allergies:  Sulfa antibiotics      Current Outpatient Medications:     alclometasone (ACLOVATE) 0.05 % cream, Apply 1 application  topically to the appropriate area as directed 2 (Two) Times a Day., Disp: , Rfl:     brimonidine-timolol (Combigan) 0.2-0.5 % ophthalmic solution, 1 drop., Disp: , Rfl:     gabapentin (NEURONTIN) 600 MG tablet, Take 1 tablet by mouth 3 (Three) Times a Day., Disp: 360 tablet, Rfl: 1    ketoconazole (NIZORAL) 2 % shampoo, Apply  topically to the appropriate area as directed 2 (Two) Times a Week., Disp: 240 mL, Rfl: 5    latanoprost (XALATAN) 0.005 % ophthalmic solution, latanoprost 0.005 % eye drops, Disp: , Rfl:     lisinopril (PRINIVIL,ZESTRIL) 20 MG tablet, Take 1 tablet by mouth Daily., Disp: 90 tablet, Rfl: 3    metoprolol tartrate (LOPRESSOR) 25 MG tablet, Take 1 tablet by mouth Daily., Disp: 90 tablet, Rfl: 3    pantoprazole (PROTONIX) 40 MG EC tablet, Take 1 tablet by mouth Daily., Disp: 90 tablet, Rfl: 3    traMADol (ULTRAM) 50 MG tablet, Take 1 tablet by mouth 3 (Three) Times a Day As Needed for Moderate Pain. for pain, Disp: 90 tablet, Rfl: 5    Wound Dressings (Medihoney Wound &Burn Dressing) paste, Apply 1 application  topically Daily.,  Disp: 103 mL, Rfl: 1    Past Medical History:   Diagnosis Date    Age-related osteoporosis without current pathological fracture     Congestive heart failure (CHF)     UNSPECIFIED, HF CHRONICITY, UNSPECIFIED HEART FAILURE TYPE    Difficulty in walking, not elsewhere classified     Diverticulitis     Essential (primary) hypertension     Gastric ulcer 01/2020    Heart failure, unspecified     Hip pain, right     Hypothyroidism, unspecified     OA (osteoarthritis)     Osteoporosis     Spinal stenosis     Vitamin D deficiency, unspecified      Past Surgical History:   Procedure Laterality Date    APPENDECTOMY      CATARACT EXTRACTION      KIDNEY STONE SURGERY  01/2018    TOTAL HIP ARTHROPLASTY REVISION Right 2000    WISDOM TOOTH EXTRACTION  2020    UPPER     Social History     Socioeconomic History    Marital status:    Tobacco Use    Smoking status: Never    Smokeless tobacco: Never   Vaping Use    Vaping Use: Never used   Substance and Sexual Activity    Alcohol use: Never    Drug use: Never    Sexual activity: Defer           Objective     Vitals:    09/07/23 1327   BP: 118/76   BP Location: Left arm   Patient Position: Sitting   Pulse: 66   Resp: 16   Temp: 97.8 °F (36.6 °C)   TempSrc: Temporal   PainSc: 0-No pain     There is no height or weight on file to calculate BMI.    STEADI Fall Risk Assessment was completed, and patient is at LOW risk for falls.Assessment completed on:1/18/2023     Review of Systems     ROS:  Per HPI.     I have reviewed the HPI and ROS as documented by MA/RN. Jessica De Leon MD    Physical Exam     NAD  Pleasant, cooperative  Varicose veins diffusely distal BLE  Patchy discoloration and dry skin medial mid distal LLE.  Rash and dry skin is improved.  There is still a shallow ulceration proximally but there is also looks healthier.  No evidence of infection.  Palpable pedal pulses.  Patient has significant varicosities and telangiectasias.  The area of the rash and scarring along the  medial distal left leg and ankle are firm and slightly tender.  There is a similar area underlying the rash on the lateral ankle.  These areas appear to be scarring, possibly from old superficial thrombophlebitis.  No calf tenderness and no edema, negative Homans.      See photos for details.    LLE:        Result Review :  The following data was reviewed by: Jessica De Leon MD on 09/07/2023:    PCP notes, CBC, CMP             Assessment and Plan   Diagnoses and all orders for this visit:    1. Ulcer of left lower extremity, limited to breakdown of skin (Primary)  -     Wound Dressings (Medihoney Wound &Burn Dressing) paste; Apply 1 application  topically Daily.  Dispense: 103 mL; Refill: 1  -     Duplex Venous Lower Extremity - Left CAR; Future    2. Venous stasis dermatitis of left lower extremity    3. Peripheral polyneuropathy    4. Thrombophlebitis of left lower extremity  -     Duplex Venous Lower Extremity - Left CAR; Future      Patient has improvement in the chronic LLE rash.  There is still a shallow ulceration LLE.  She should discontinue using the L clobetasol ointment now as there is no further acute rash or inflammatory condition.  We will have her apply a thin layer of Medihoney directly to the ulceration and cover with a nonstick bandage.  She can continue to apply Aquaphor to the remainder of the skin and continue wrapping her LLE gently with an Ace bandage to control swelling.  She should also elevate her legs as much as possible when seated.      Recheck 4 weeks.    Patient was given instructions and counseling regarding their condition or for health maintenance advice, as well as the wound care plan and recommendations. They understand and agree with the plan.  They will follow back up here in the clinic but are instructed to contact us in the interim should they have any new, returning, or worsening symptoms or concerns. Please see specific information pulled into the AVS if appropriate.      Mary Dictation utilized for chart completion.    Follow Up   Return in about 4 weeks (around 10/5/2023).      Jessica De Leon MD

## 2023-09-12 ENCOUNTER — HOSPITAL ENCOUNTER (OUTPATIENT)
Dept: CARDIOLOGY | Facility: HOSPITAL | Age: 88
Discharge: HOME | End: 2023-09-12
Admitting: EMERGENCY MEDICINE
Payer: MEDICARE

## 2023-09-12 DIAGNOSIS — L97.921 ULCER OF LEFT LOWER EXTREMITY, LIMITED TO BREAKDOWN OF SKIN: ICD-10-CM

## 2023-09-12 DIAGNOSIS — I80.292: ICD-10-CM

## 2023-09-12 LAB
BH CV LOWER VASCULAR LEFT COMMON FEMORAL AUGMENT: NORMAL
BH CV LOWER VASCULAR LEFT COMMON FEMORAL COMPETENT: NORMAL
BH CV LOWER VASCULAR LEFT COMMON FEMORAL COMPRESS: NORMAL
BH CV LOWER VASCULAR LEFT COMMON FEMORAL PHASIC: NORMAL
BH CV LOWER VASCULAR LEFT COMMON FEMORAL SPONT: NORMAL
BH CV LOWER VASCULAR LEFT DISTAL FEMORAL COMPRESS: NORMAL
BH CV LOWER VASCULAR LEFT GASTRONEMIUS COMPRESS: NORMAL
BH CV LOWER VASCULAR LEFT GREATER SAPH AK COMPRESS: NORMAL
BH CV LOWER VASCULAR LEFT GREATER SAPH BK COMPRESS: NORMAL
BH CV LOWER VASCULAR LEFT LESSER SAPH COMPRESS: NORMAL
BH CV LOWER VASCULAR LEFT MID FEMORAL AUGMENT: NORMAL
BH CV LOWER VASCULAR LEFT MID FEMORAL COMPETENT: NORMAL
BH CV LOWER VASCULAR LEFT MID FEMORAL COMPRESS: NORMAL
BH CV LOWER VASCULAR LEFT MID FEMORAL PHASIC: NORMAL
BH CV LOWER VASCULAR LEFT MID FEMORAL SPONT: NORMAL
BH CV LOWER VASCULAR LEFT PERONEAL COMPRESS: NORMAL
BH CV LOWER VASCULAR LEFT POPLITEAL AUGMENT: NORMAL
BH CV LOWER VASCULAR LEFT POPLITEAL COMPETENT: NORMAL
BH CV LOWER VASCULAR LEFT POPLITEAL COMPRESS: NORMAL
BH CV LOWER VASCULAR LEFT POPLITEAL PHASIC: NORMAL
BH CV LOWER VASCULAR LEFT POPLITEAL SPONT: NORMAL
BH CV LOWER VASCULAR LEFT POSTERIOR TIBIAL COMPRESS: NORMAL
BH CV LOWER VASCULAR LEFT PROXIMAL FEMORAL COMPRESS: NORMAL
BH CV LOWER VASCULAR LEFT SAPHENOFEMORAL JUNCTION COMPRESS: NORMAL
BH CV LOWER VASCULAR RIGHT COMMON FEMORAL AUGMENT: NORMAL
BH CV LOWER VASCULAR RIGHT COMMON FEMORAL COMPETENT: NORMAL
BH CV LOWER VASCULAR RIGHT COMMON FEMORAL COMPRESS: NORMAL
BH CV LOWER VASCULAR RIGHT COMMON FEMORAL PHASIC: NORMAL
BH CV LOWER VASCULAR RIGHT COMMON FEMORAL SPONT: NORMAL

## 2023-09-12 PROCEDURE — 93971 EXTREMITY STUDY: CPT

## 2023-10-18 DIAGNOSIS — G62.9 PERIPHERAL POLYNEUROPATHY: ICD-10-CM

## 2023-10-18 RX ORDER — GABAPENTIN 600 MG/1
600 TABLET ORAL 3 TIMES DAILY
Qty: 360 TABLET | Refills: 1 | Status: SHIPPED | OUTPATIENT
Start: 2023-10-18

## 2023-10-18 NOTE — TELEPHONE ENCOUNTER
Caller: GIGI ZULETA    Relationship: Emergency Contact    Best call back number: [phone]    Requested Prescriptions:   Requested Prescriptions     Pending Prescriptions Disp Refills    gabapentin (NEURONTIN) 600 MG tablet 360 tablet 1     Sig: Take 1 tablet by mouth 3 (Three) Times a Day.        Pharmacy where request should be sent: [facility] [address] [phone] [facility] [phone] [facility]     Last office visit with prescribing clinician: 7/18/2023   Last telemedicine visit with prescribing clinician: Visit date not found   Next office visit with prescribing clinician: 1/18/2024     Does the patient have less than a 3 day supply:  [] Yes  [x] No

## 2023-11-27 ENCOUNTER — TELEPHONE (OUTPATIENT)
Dept: INTERNAL MEDICINE | Facility: CLINIC | Age: 88
End: 2023-11-27
Payer: MEDICARE

## 2023-11-27 NOTE — TELEPHONE ENCOUNTER
Caller: ROSHAN PIERRE    Relationship: Other Relative    Best call back number: [phone]    What is the best time to reach you: ANYTIME     Who are you requesting to speak with (clinical staff, provider,  specific staff member): CLINICAL          What was the call regarding:        THE PATIENT IS WANTING TO KNOW IF THE PATIENT SHOULD GET THE COVID SHOT. SHE SAID SHE IS BRINGING HER IN TO GET THE FLU SHOT AND IS WANTING TO KNOW IF THEY CAN BE GIVEN AT THE SAME TIME

## 2023-12-19 RX ORDER — PANTOPRAZOLE SODIUM 40 MG/1
40 TABLET, DELAYED RELEASE ORAL DAILY
Qty: 90 TABLET | Refills: 3 | Status: SHIPPED | OUTPATIENT
Start: 2023-12-19

## 2024-01-16 DIAGNOSIS — G62.9 PERIPHERAL POLYNEUROPATHY: ICD-10-CM

## 2024-01-16 RX ORDER — LISINOPRIL 20 MG/1
20 TABLET ORAL DAILY
Qty: 90 TABLET | Refills: 3 | Status: SHIPPED | OUTPATIENT
Start: 2024-01-16

## 2024-01-16 RX ORDER — TRAMADOL HYDROCHLORIDE 50 MG/1
50 TABLET ORAL 3 TIMES DAILY PRN
Qty: 90 TABLET | Refills: 5 | Status: SHIPPED | OUTPATIENT
Start: 2024-01-16

## 2024-01-16 NOTE — TELEPHONE ENCOUNTER
Caller: ROSHAN PIERRE    Relationship: Child    Best call back number:     [phone]       Requested Prescriptions:   Requested Prescriptions     Pending Prescriptions Disp Refills    traMADol (ULTRAM) 50 MG tablet 90 tablet 5     Sig: Take 1 tablet by mouth 3 (Three) Times a Day As Needed for Moderate Pain. for pain        Pharmacy where request should be sent: [facility] [address] [phone] [facility] [phone]      Last office visit with prescribing clinician: 7/18/2023   Last telemedicine visit with prescribing clinician: Visit date not found   Next office visit with prescribing clinician: 1/31/2024     Additional details provided by patient: PATIENT'S DAUGHTER REQUESTS A PHONE CALL TO KNOW IF THIS WILL BE FILLED.    Does the patient have less than a 3 day supply:  [] Yes  [] No    Would you like a call back once the refill request has been completed: [] Yes [] No    If the office needs to give you a call back, can they leave a voicemail: [] Yes [] No    Alfie Smith Rep   01/16/24 11:04 EST

## 2024-01-22 ENCOUNTER — LAB (OUTPATIENT)
Dept: LAB | Facility: HOSPITAL | Age: 89
End: 2024-01-22
Payer: MEDICARE

## 2024-01-22 DIAGNOSIS — B02.22 TRIGEMINAL HERPES ZOSTER: ICD-10-CM

## 2024-01-22 DIAGNOSIS — L97.921 ULCER OF LEFT LOWER EXTREMITY, LIMITED TO BREAKDOWN OF SKIN: ICD-10-CM

## 2024-01-22 DIAGNOSIS — Z00.00 MEDICARE ANNUAL WELLNESS VISIT, SUBSEQUENT: ICD-10-CM

## 2024-01-22 DIAGNOSIS — I10 HYPERTENSION, ESSENTIAL: ICD-10-CM

## 2024-01-22 DIAGNOSIS — G62.9 PERIPHERAL POLYNEUROPATHY: ICD-10-CM

## 2024-01-22 DIAGNOSIS — M47.892 OTHER OSTEOARTHRITIS OF SPINE, CERVICAL REGION: ICD-10-CM

## 2024-01-22 DIAGNOSIS — E55.9 VITAMIN D DEFICIENCY: ICD-10-CM

## 2024-01-22 LAB
ALBUMIN SERPL-MCNC: 3.7 G/DL (ref 3.5–5.2)
ALBUMIN/GLOB SERPL: 1.4 G/DL
ALP SERPL-CCNC: 67 U/L (ref 39–117)
ALT SERPL W P-5'-P-CCNC: 8 U/L (ref 1–33)
ANION GAP SERPL CALCULATED.3IONS-SCNC: 9 MMOL/L (ref 5–15)
AST SERPL-CCNC: 16 U/L (ref 1–32)
BASOPHILS # BLD AUTO: 0.02 10*3/MM3 (ref 0–0.2)
BASOPHILS NFR BLD AUTO: 0.3 % (ref 0–1.5)
BILIRUB SERPL-MCNC: 0.3 MG/DL (ref 0–1.2)
BUN SERPL-MCNC: 15 MG/DL (ref 8–23)
BUN/CREAT SERPL: 21.4 (ref 7–25)
CALCIUM SPEC-SCNC: 9.3 MG/DL (ref 8.2–9.6)
CHLORIDE SERPL-SCNC: 103 MMOL/L (ref 98–107)
CO2 SERPL-SCNC: 26 MMOL/L (ref 22–29)
CREAT SERPL-MCNC: 0.7 MG/DL (ref 0.57–1)
DEPRECATED RDW RBC AUTO: 40 FL (ref 37–54)
EGFRCR SERPLBLD CKD-EPI 2021: 82.3 ML/MIN/1.73
EOSINOPHIL # BLD AUTO: 0.1 10*3/MM3 (ref 0–0.4)
EOSINOPHIL NFR BLD AUTO: 1.6 % (ref 0.3–6.2)
ERYTHROCYTE [DISTWIDTH] IN BLOOD BY AUTOMATED COUNT: 12.7 % (ref 12.3–15.4)
GLOBULIN UR ELPH-MCNC: 2.6 GM/DL
GLUCOSE SERPL-MCNC: 73 MG/DL (ref 65–99)
HCT VFR BLD AUTO: 36.8 % (ref 34–46.6)
HGB BLD-MCNC: 12 G/DL (ref 12–15.9)
IMM GRANULOCYTES # BLD AUTO: 0.02 10*3/MM3 (ref 0–0.05)
IMM GRANULOCYTES NFR BLD AUTO: 0.3 % (ref 0–0.5)
LYMPHOCYTES # BLD AUTO: 1.48 10*3/MM3 (ref 0.7–3.1)
LYMPHOCYTES NFR BLD AUTO: 23.5 % (ref 19.6–45.3)
MCH RBC QN AUTO: 28.8 PG (ref 26.6–33)
MCHC RBC AUTO-ENTMCNC: 32.6 G/DL (ref 31.5–35.7)
MCV RBC AUTO: 88.5 FL (ref 79–97)
MONOCYTES # BLD AUTO: 0.63 10*3/MM3 (ref 0.1–0.9)
MONOCYTES NFR BLD AUTO: 10 % (ref 5–12)
NEUTROPHILS NFR BLD AUTO: 4.06 10*3/MM3 (ref 1.7–7)
NEUTROPHILS NFR BLD AUTO: 64.3 % (ref 42.7–76)
NRBC BLD AUTO-RTO: 0.2 /100 WBC (ref 0–0.2)
PLATELET # BLD AUTO: 305 10*3/MM3 (ref 140–450)
PMV BLD AUTO: 10.3 FL (ref 6–12)
POTASSIUM SERPL-SCNC: 4 MMOL/L (ref 3.5–5.2)
PROT SERPL-MCNC: 6.3 G/DL (ref 6–8.5)
RBC # BLD AUTO: 4.16 10*6/MM3 (ref 3.77–5.28)
SODIUM SERPL-SCNC: 138 MMOL/L (ref 136–145)
WBC NRBC COR # BLD AUTO: 6.31 10*3/MM3 (ref 3.4–10.8)

## 2024-01-22 PROCEDURE — 80053 COMPREHEN METABOLIC PANEL: CPT

## 2024-01-22 PROCEDURE — 85025 COMPLETE CBC W/AUTO DIFF WBC: CPT

## 2024-01-22 PROCEDURE — 36415 COLL VENOUS BLD VENIPUNCTURE: CPT

## 2024-01-31 ENCOUNTER — OFFICE VISIT (OUTPATIENT)
Dept: INTERNAL MEDICINE | Facility: CLINIC | Age: 89
End: 2024-01-31
Payer: MEDICARE

## 2024-01-31 VITALS
HEIGHT: 59 IN | SYSTOLIC BLOOD PRESSURE: 147 MMHG | HEART RATE: 63 BPM | WEIGHT: 113 LBS | DIASTOLIC BLOOD PRESSURE: 77 MMHG | OXYGEN SATURATION: 95 % | TEMPERATURE: 98.2 F | BODY MASS INDEX: 22.78 KG/M2

## 2024-01-31 DIAGNOSIS — E55.9 VITAMIN D DEFICIENCY: ICD-10-CM

## 2024-01-31 DIAGNOSIS — G62.9 PERIPHERAL POLYNEUROPATHY: Primary | ICD-10-CM

## 2024-01-31 DIAGNOSIS — I10 HYPERTENSION, ESSENTIAL: ICD-10-CM

## 2024-01-31 DIAGNOSIS — B02.22 TRIGEMINAL HERPES ZOSTER: ICD-10-CM

## 2024-01-31 DIAGNOSIS — R21 RASH: ICD-10-CM

## 2024-01-31 DIAGNOSIS — L50.9 URTICARIA: ICD-10-CM

## 2024-01-31 DIAGNOSIS — L97.921 ULCER OF LEFT LOWER EXTREMITY, LIMITED TO BREAKDOWN OF SKIN: ICD-10-CM

## 2024-01-31 DIAGNOSIS — M47.892 OTHER OSTEOARTHRITIS OF SPINE, CERVICAL REGION: ICD-10-CM

## 2024-01-31 RX ORDER — TRIAMCINOLONE ACETONIDE 1 MG/G
1 CREAM TOPICAL 2 TIMES DAILY
Qty: 454 G | Refills: 2 | Status: SHIPPED | OUTPATIENT
Start: 2024-01-31

## 2024-01-31 NOTE — PROGRESS NOTES
"CHIEF COMPLAINT/ HPI:  Hypertension (Routine follow up, lab follow up, medication refill. Pt states itching on back , bottom and shoulders. Pt is using a anti- itch lotion. )    Patient had concerns about medications and the rash and itching,          Objective   Vital Signs  Vitals:    01/31/24 1052   BP: 147/77   Pulse: 63   Temp: 98.2 °F (36.8 °C)   SpO2: 95%   Weight: 51.3 kg (113 lb)   Height: 149.9 cm (59.02\")      Body mass index is 22.81 kg/m².  Review of Systems   Constitutional: Negative.    HENT: Negative.     Eyes: Negative.    Respiratory: Negative.     Cardiovascular: Negative.    Gastrointestinal: Negative.    Endocrine: Negative.    Genitourinary: Negative.    Musculoskeletal: Negative.    Allergic/Immunologic: Negative.    Neurological: Negative.    Hematological: Negative.    Psychiatric/Behavioral: Negative.      Rash , itching     Physical Exam  Constitutional:       General: She is not in acute distress.     Appearance: Normal appearance.   HENT:      Head: Normocephalic.      Mouth/Throat:      Mouth: Mucous membranes are moist.   Eyes:      Conjunctiva/sclera: Conjunctivae normal.      Pupils: Pupils are equal, round, and reactive to light.   Cardiovascular:      Rate and Rhythm: Normal rate and regular rhythm.      Pulses: Normal pulses.      Heart sounds: Normal heart sounds.   Pulmonary:      Effort: Pulmonary effort is normal.      Breath sounds: Normal breath sounds.   Abdominal:      General: Abdomen is flat. Bowel sounds are normal.      Palpations: Abdomen is soft.   Musculoskeletal:         General: No swelling. Normal range of motion.      Cervical back: Neck supple.   Skin:     General: Skin is warm and dry.      Coloration: Skin is not jaundiced.   Neurological:      General: No focal deficit present.      Mental Status: She is alert and oriented to person, place, and time. Mental status is at baseline.   Psychiatric:         Mood and Affect: Mood normal.         Behavior: Behavior " "normal.         Thought Content: Thought content normal.         Judgment: Judgment normal.        Result Review :   No results found for: \"PROBNP\", \"BNP\"  CMP          7/12/2023    12:56 1/22/2024    14:12   CMP   Glucose 79  73    BUN 20  15    Creatinine 0.85  0.70    EGFR 65.6  82.3    Sodium 140  138    Potassium 5.0  4.0    Chloride 105  103    Calcium 9.4  9.3    Total Protein 6.0  6.3    Albumin 3.9  3.7    Globulin 2.1  2.6    Total Bilirubin 0.3  0.3    Alkaline Phosphatase 55  67    AST (SGOT) 20  16    ALT (SGPT) 11  8    Albumin/Globulin Ratio 1.9  1.4    BUN/Creatinine Ratio 23.5  21.4    Anion Gap 7.6  9.0      CBC w/diff          7/12/2023    12:56 1/22/2024    14:12   CBC w/Diff   WBC 5.80  6.31    RBC 4.11  4.16    Hemoglobin 12.3  12.0    Hematocrit 37.1  36.8    MCV 90.3  88.5    MCH 29.9  28.8    MCHC 33.2  32.6    RDW 12.6  12.7    Platelets 266  305    Neutrophil Rel % 53.5  64.3    Immature Granulocyte Rel % 0.2  0.3    Lymphocyte Rel % 33.3  23.5    Monocyte Rel % 10.9  10.0    Eosinophil Rel % 1.6  1.6    Basophil Rel % 0.5  0.3          Lab Results   Component Value Date    TSH 3.640 06/17/2021    TSH 2.750 11/12/2020    TSH 5.800 (H) 09/24/2019      Lab Results   Component Value Date    FREET4 1.2 11/12/2020                          Visit Diagnoses:    ICD-10-CM ICD-9-CM   1. Peripheral polyneuropathy  G62.9 356.9   2. Ulcer of left lower extremity, limited to breakdown of skin  L97.921 707.10   3. Other osteoarthritis of spine, cervical region  M47.892 721.0   4. Trigeminal herpes zoster  B02.22 053.12   5. Vitamin D deficiency  E55.9 268.9   6. Hypertension, essential  I10 401.9   7. Urticaria  L50.9 708.9   8. Rash  R21 782.1       Assessment and Plan   Diagnoses and all orders for this visit:    1. Peripheral polyneuropathy (Primary)  -     TSH+Free T4; Future  -     Vitamin B12 anemia; Future  -     Folate anemia; Future  -     Vitamin D,25-Hydroxy; Future  -     Comprehensive " Metabolic Panel; Future  -     CBC & Differential; Future    2. Ulcer of left lower extremity, limited to breakdown of skin  -     TSH+Free T4; Future  -     Vitamin B12 anemia; Future  -     Folate anemia; Future  -     Vitamin D,25-Hydroxy; Future  -     Comprehensive Metabolic Panel; Future  -     CBC & Differential; Future    3. Other osteoarthritis of spine, cervical region  -     TSH+Free T4; Future  -     Vitamin B12 anemia; Future  -     Folate anemia; Future  -     Vitamin D,25-Hydroxy; Future  -     Comprehensive Metabolic Panel; Future  -     CBC & Differential; Future    4. Trigeminal herpes zoster  -     TSH+Free T4; Future  -     Vitamin B12 anemia; Future  -     Folate anemia; Future  -     Vitamin D,25-Hydroxy; Future  -     Comprehensive Metabolic Panel; Future  -     CBC & Differential; Future    5. Vitamin D deficiency  -     TSH+Free T4; Future  -     Vitamin B12 anemia; Future  -     Folate anemia; Future  -     Vitamin D,25-Hydroxy; Future  -     Comprehensive Metabolic Panel; Future  -     CBC & Differential; Future    6. Hypertension, essential  -     TSH+Free T4; Future  -     Vitamin B12 anemia; Future  -     Folate anemia; Future  -     Vitamin D,25-Hydroxy; Future  -     Comprehensive Metabolic Panel; Future  -     CBC & Differential; Future    7. Urticaria  -     TSH+Free T4; Future  -     Vitamin B12 anemia; Future  -     Folate anemia; Future  -     Vitamin D,25-Hydroxy; Future  -     Comprehensive Metabolic Panel; Future  -     CBC & Differential; Future    8. Rash  -     TSH+Free T4; Future  -     Vitamin B12 anemia; Future  -     Folate anemia; Future  -     Vitamin D,25-Hydroxy; Future  -     Comprehensive Metabolic Panel; Future  -     CBC & Differential; Future    Other orders  -     triamcinolone (KENALOG) 0.1 % cream; Apply 1 Application topically to the appropriate area as directed 2 (Two) Times a Day.  Dispense: 454 g; Refill: 2    Rash dry skin eczema lower back, continue  using topical cortisone creams, January 2024    BMI is within normal parameters. No other follow-up for BMI required.,  Weight loss, about  11 pounds over the past year and a half, as of January 2024    Annual wellness visit completed July 18, 2023    Left leg ulcer , venous changes mid PreTAB medial area, patient did see the wound care center Dr. Veronica, has been released, leg ulcer has been improving as of January 2024, no further follow-up    Herpes zoster, trigeminal branch mandibular branch left cheek, previous treatments with gabapentin acyclovir pain medications, going back to May 2020, improved as of January 2022,, stable July 2022, and January 2023    Osteoarthritis of the neck of neuropathy, left neck mainly, continues on gabapentin 3 times daily,, tramadol, 3 times daily, continues    Vitamin D deficiency    Hypertension continues  lisinopril 20 mg daily, Toprol-XL 25 mg daily    Chronic pain syndrome chronic right hip pain low back pain---cont  on tramadol 3 times daily, gabapentin  tid as above    Previous cataract surgery    Glaucoma    appendectomy,  right total hip arthroplasty 2000    spinal stenosis    kidney stone with UTI and sepsis December 2017 in the hospital             Follow Up   Return in about 6 months (around 7/31/2024).  Patient was given instructions and counseling regarding her condition or for health maintenance advice. Please see specific information pulled into the AVS if appropriate.

## 2024-03-04 ENCOUNTER — TELEMEDICINE (OUTPATIENT)
Dept: INTERNAL MEDICINE | Facility: CLINIC | Age: 89
End: 2024-03-04
Payer: MEDICARE

## 2024-03-04 DIAGNOSIS — U07.1 COVID-19 VIRUS INFECTION: Primary | ICD-10-CM

## 2024-03-04 PROCEDURE — 99213 OFFICE O/P EST LOW 20 MIN: CPT | Performed by: INTERNAL MEDICINE

## 2024-03-04 RX ORDER — NIRMATRELVIR AND RITONAVIR 150-100 MG
2 KIT ORAL 2 TIMES DAILY
Qty: 20 EACH | Refills: 0 | Status: SHIPPED | OUTPATIENT
Start: 2024-03-04

## 2024-03-04 NOTE — PROGRESS NOTES
"Patient is here for telehealth visit, consents to visit, patient is being seen by audio and video, patient is being seen at home and I am performing the visit at my office.    CHIEF COMPLAINT/ HPI: Patient family concerned about possible COVID and symptoms, body aches fevers muscle aches, no cough, tested positive today for COVID at home test, started feeling bad last night, was hoping to get medication called in, she is being seen by telehealth,  No chief complaint on file.              Objective   Vital Signs  There were no vitals filed for this visit.   There is no height or weight on file to calculate BMI.  Review of Systems symptoms as above,  Physical Exam patient is alert and oriented x3, stable health, no rash on the face or neck  Result Review :   No results found for: \"PROBNP\", \"BNP\"  CMP          7/12/2023    12:56 1/22/2024    14:12   CMP   Glucose 79  73    BUN 20  15    Creatinine 0.85  0.70    EGFR 65.6  82.3    Sodium 140  138    Potassium 5.0  4.0    Chloride 105  103    Calcium 9.4  9.3    Total Protein 6.0  6.3    Albumin 3.9  3.7    Globulin 2.1  2.6    Total Bilirubin 0.3  0.3    Alkaline Phosphatase 55  67    AST (SGOT) 20  16    ALT (SGPT) 11  8    Albumin/Globulin Ratio 1.9  1.4    BUN/Creatinine Ratio 23.5  21.4    Anion Gap 7.6  9.0      CBC w/diff          7/12/2023    12:56 1/22/2024    14:12   CBC w/Diff   WBC 5.80  6.31    RBC 4.11  4.16    Hemoglobin 12.3  12.0    Hematocrit 37.1  36.8    MCV 90.3  88.5    MCH 29.9  28.8    MCHC 33.2  32.6    RDW 12.6  12.7    Platelets 266  305    Neutrophil Rel % 53.5  64.3    Immature Granulocyte Rel % 0.2  0.3    Lymphocyte Rel % 33.3  23.5    Monocyte Rel % 10.9  10.0    Eosinophil Rel % 1.6  1.6    Basophil Rel % 0.5  0.3          Lab Results   Component Value Date    TSH 3.640 06/17/2021    TSH 2.750 11/12/2020    TSH 5.800 (H) 09/24/2019      Lab Results   Component Value Date    FREET4 1.2 11/12/2020                          Visit Diagnoses:   "  ICD-10-CM ICD-9-CM   1. COVID-19 virus infection  U07.1 079.89       Assessment and Plan   Diagnoses and all orders for this visit:    1. COVID-19 virus infection (Primary)    Other orders  -     Nirmatrelvir&Ritonavir 150/100 (Paxlovid, 150/100,) 10 x 150 MG & 10 x 100MG tablet therapy pack tablet (for renal adjustment); Take 2 tablets by mouth 2 (Two) Times a Day.  Dispense: 20 each; Refill: 0        Treatment options with Paxlovid discussed, medications reviewed with patient family by telehealth, continue Tylenol for symptom relief, push fluids,          Follow Up   No follow-ups on file.  Patient was given instructions and counseling regarding her condition or for health maintenance advice. Please see specific information pulled into the AVS if appropriate.

## 2024-05-13 DIAGNOSIS — G62.9 PERIPHERAL POLYNEUROPATHY: ICD-10-CM

## 2024-05-13 RX ORDER — GABAPENTIN 600 MG/1
600 TABLET ORAL 3 TIMES DAILY
Qty: 360 TABLET | Refills: 1 | Status: SHIPPED | OUTPATIENT
Start: 2024-05-13 | End: 2024-05-15 | Stop reason: SDUPTHER

## 2024-05-13 RX ORDER — TRAMADOL HYDROCHLORIDE 50 MG/1
50 TABLET ORAL 3 TIMES DAILY PRN
Qty: 90 TABLET | Refills: 5 | Status: SHIPPED | OUTPATIENT
Start: 2024-05-13 | End: 2024-05-15 | Stop reason: SDUPTHER

## 2024-05-13 NOTE — TELEPHONE ENCOUNTER
Caller: MaritoLidia    Relationship: Emergency Contact    Best call back number: [phone]     Requested Prescriptions:   Requested Prescriptions     Pending Prescriptions Disp Refills    gabapentin (NEURONTIN) 600 MG tablet 360 tablet 1     Sig: Take 1 tablet by mouth 3 (Three) Times a Day.    traMADol (ULTRAM) 50 MG tablet 90 tablet 5     Sig: Take 1 tablet by mouth 3 (Three) Times a Day As Needed for Moderate Pain. for pain        Pharmacy where request should be sent: [facility] [address] [phone] [facility] [phone]      Last office visit with prescribing clinician: 1/31/2024   Last telemedicine visit with prescribing clinician: 3/4/2024   Next office visit with prescribing clinician: 7/25/2024     Additional details provided by patient: ONE WEEK OF DOSES LEFT    Does the patient have less than a 3 day supply:  [] Yes  [x] No    Would you like a call back once the refill request has been completed: [] Yes [] No    If the office needs to give you a call back, can they leave a voicemail: [x] Yes [] No    NO MYCHART. MAY CALL DAUGHTER OR PATIENT IF NEEDED.    Alfie Marinelli Rep   05/13/24 09:37 EDT

## 2024-05-15 DIAGNOSIS — G62.9 PERIPHERAL POLYNEUROPATHY: ICD-10-CM

## 2024-05-15 RX ORDER — TRAMADOL HYDROCHLORIDE 50 MG/1
50 TABLET ORAL 3 TIMES DAILY PRN
Qty: 90 TABLET | Refills: 5 | Status: SHIPPED | OUTPATIENT
Start: 2024-05-15

## 2024-05-15 RX ORDER — GABAPENTIN 600 MG/1
600 TABLET ORAL 3 TIMES DAILY
Qty: 360 TABLET | Refills: 1 | Status: SHIPPED | OUTPATIENT
Start: 2024-05-15

## 2024-05-15 NOTE — TELEPHONE ENCOUNTER
Caller: Lidia Devi    Relationship: Emergency Contact    Best call back number: [phone]    Requested Prescriptions:   Requested Prescriptions     Pending Prescriptions Disp Refills    gabapentin (NEURONTIN) 600 MG tablet 360 tablet 1     Sig: Take 1 tablet by mouth 3 (Three) Times a Day.    traMADol (ULTRAM) 50 MG tablet 90 tablet 5     Sig: Take 1 tablet by mouth 3 (Three) Times a Day As Needed for Moderate Pain. for pain        Pharmacy where request should be sent: [facility] [address] [place] [address] [phone] [facility] [phone] [facility]     Last office visit with prescribing clinician: 1/31/2024   Last telemedicine visit with prescribing clinician: 3/4/2024   Next office visit with prescribing clinician: 7/25/2024     Additional details provided by patient: PATIENTS DAUGHTER CALLED STATING [facility] DID RECEIVE THE PATIENTS PRESCRIPTION FOR THESE TWO MEDICATIONS BUT THEY ARE UNABLE TO DELIVER THEM UNTIL THE 27TH OF THIS MONTH. PATIENT WILL BE RUNNING OUT ON 5.18.24 AND  THEY WERE TOLD TO ASK IF MD AGGARW[facility] COULD SEND A SMALL EMERGENCY SCRIPT TO [facility]. PATIENTS DAUGHTER WOULD LIKE A CALL BACK WHEN COMPLETED AND WANTS TO MAKE SURE THIS WILL BE OKAY AND COVERED BY INSURANCE.     Does the patient have less than a 3 day supply:  [x] Yes  [] No    Would you like a call back once the refill request has been completed: [x] Yes [] No    If the office needs to give you a call back, can they leave a voicemail: [x] Yes [] No    Alfie Spencer Rep   05/15/24 15:10 EDT

## 2024-07-18 ENCOUNTER — LAB (OUTPATIENT)
Dept: LAB | Facility: HOSPITAL | Age: 89
End: 2024-07-18
Payer: MEDICARE

## 2024-07-18 DIAGNOSIS — R21 RASH: ICD-10-CM

## 2024-07-18 DIAGNOSIS — I10 HYPERTENSION, ESSENTIAL: ICD-10-CM

## 2024-07-18 DIAGNOSIS — L50.9 URTICARIA: ICD-10-CM

## 2024-07-18 DIAGNOSIS — B02.22 TRIGEMINAL HERPES ZOSTER: ICD-10-CM

## 2024-07-18 DIAGNOSIS — M47.892 OTHER OSTEOARTHRITIS OF SPINE, CERVICAL REGION: ICD-10-CM

## 2024-07-18 DIAGNOSIS — G62.9 PERIPHERAL POLYNEUROPATHY: ICD-10-CM

## 2024-07-18 DIAGNOSIS — E55.9 VITAMIN D DEFICIENCY: ICD-10-CM

## 2024-07-18 DIAGNOSIS — L97.921 ULCER OF LEFT LOWER EXTREMITY, LIMITED TO BREAKDOWN OF SKIN: ICD-10-CM

## 2024-07-18 LAB
25(OH)D3 SERPL-MCNC: 25.4 NG/ML (ref 30–100)
ALBUMIN SERPL-MCNC: 3.7 G/DL (ref 3.5–5.2)
ALBUMIN/GLOB SERPL: 1.7 G/DL
ALP SERPL-CCNC: 64 U/L (ref 39–117)
ALT SERPL W P-5'-P-CCNC: 9 U/L (ref 1–33)
ANION GAP SERPL CALCULATED.3IONS-SCNC: 7.5 MMOL/L (ref 5–15)
AST SERPL-CCNC: 17 U/L (ref 1–32)
BASOPHILS # BLD AUTO: 0.03 10*3/MM3 (ref 0–0.2)
BASOPHILS NFR BLD AUTO: 0.4 % (ref 0–1.5)
BILIRUB SERPL-MCNC: 0.2 MG/DL (ref 0–1.2)
BUN SERPL-MCNC: 14 MG/DL (ref 8–23)
BUN/CREAT SERPL: 19.2 (ref 7–25)
CALCIUM SPEC-SCNC: 9.6 MG/DL (ref 8.2–9.6)
CHLORIDE SERPL-SCNC: 106 MMOL/L (ref 98–107)
CO2 SERPL-SCNC: 27.5 MMOL/L (ref 22–29)
CREAT SERPL-MCNC: 0.73 MG/DL (ref 0.57–1)
DEPRECATED RDW RBC AUTO: 39.5 FL (ref 37–54)
EGFRCR SERPLBLD CKD-EPI 2021: 78.2 ML/MIN/1.73
EOSINOPHIL # BLD AUTO: 0.1 10*3/MM3 (ref 0–0.4)
EOSINOPHIL NFR BLD AUTO: 1.4 % (ref 0.3–6.2)
ERYTHROCYTE [DISTWIDTH] IN BLOOD BY AUTOMATED COUNT: 12.1 % (ref 12.3–15.4)
FOLATE SERPL-MCNC: 12 NG/ML (ref 4.78–24.2)
GLOBULIN UR ELPH-MCNC: 2.2 GM/DL
GLUCOSE SERPL-MCNC: 84 MG/DL (ref 65–99)
HCT VFR BLD AUTO: 36.7 % (ref 34–46.6)
HGB BLD-MCNC: 12 G/DL (ref 12–15.9)
IMM GRANULOCYTES # BLD AUTO: 0.02 10*3/MM3 (ref 0–0.05)
IMM GRANULOCYTES NFR BLD AUTO: 0.3 % (ref 0–0.5)
LYMPHOCYTES # BLD AUTO: 1.19 10*3/MM3 (ref 0.7–3.1)
LYMPHOCYTES NFR BLD AUTO: 16.4 % (ref 19.6–45.3)
MCH RBC QN AUTO: 29.5 PG (ref 26.6–33)
MCHC RBC AUTO-ENTMCNC: 32.7 G/DL (ref 31.5–35.7)
MCV RBC AUTO: 90.2 FL (ref 79–97)
MONOCYTES # BLD AUTO: 0.55 10*3/MM3 (ref 0.1–0.9)
MONOCYTES NFR BLD AUTO: 7.6 % (ref 5–12)
NEUTROPHILS NFR BLD AUTO: 5.37 10*3/MM3 (ref 1.7–7)
NEUTROPHILS NFR BLD AUTO: 73.9 % (ref 42.7–76)
NRBC BLD AUTO-RTO: 0 /100 WBC (ref 0–0.2)
PLATELET # BLD AUTO: 269 10*3/MM3 (ref 140–450)
PMV BLD AUTO: 10.3 FL (ref 6–12)
POTASSIUM SERPL-SCNC: 4.5 MMOL/L (ref 3.5–5.2)
PROT SERPL-MCNC: 5.9 G/DL (ref 6–8.5)
RBC # BLD AUTO: 4.07 10*6/MM3 (ref 3.77–5.28)
SODIUM SERPL-SCNC: 141 MMOL/L (ref 136–145)
T4 FREE SERPL-MCNC: 1.08 NG/DL (ref 0.92–1.68)
TSH SERPL DL<=0.05 MIU/L-ACNC: 2.28 UIU/ML (ref 0.27–4.2)
VIT B12 BLD-MCNC: 280 PG/ML (ref 211–946)
WBC NRBC COR # BLD AUTO: 7.26 10*3/MM3 (ref 3.4–10.8)

## 2024-07-18 PROCEDURE — 80053 COMPREHEN METABOLIC PANEL: CPT

## 2024-07-18 PROCEDURE — 85025 COMPLETE CBC W/AUTO DIFF WBC: CPT

## 2024-07-18 PROCEDURE — 36415 COLL VENOUS BLD VENIPUNCTURE: CPT

## 2024-07-18 PROCEDURE — 82607 VITAMIN B-12: CPT

## 2024-07-18 PROCEDURE — 84439 ASSAY OF FREE THYROXINE: CPT

## 2024-07-18 PROCEDURE — 84443 ASSAY THYROID STIM HORMONE: CPT

## 2024-07-18 PROCEDURE — 82306 VITAMIN D 25 HYDROXY: CPT

## 2024-07-18 PROCEDURE — 82746 ASSAY OF FOLIC ACID SERUM: CPT

## 2024-07-25 ENCOUNTER — OFFICE VISIT (OUTPATIENT)
Dept: INTERNAL MEDICINE | Age: 89
End: 2024-07-25
Payer: MEDICARE

## 2024-07-25 VITALS
SYSTOLIC BLOOD PRESSURE: 138 MMHG | BODY MASS INDEX: 21.77 KG/M2 | HEIGHT: 59 IN | OXYGEN SATURATION: 94 % | DIASTOLIC BLOOD PRESSURE: 70 MMHG | WEIGHT: 108 LBS | TEMPERATURE: 98.2 F

## 2024-07-25 DIAGNOSIS — E55.9 VITAMIN D DEFICIENCY: ICD-10-CM

## 2024-07-25 DIAGNOSIS — I10 HYPERTENSION, ESSENTIAL: ICD-10-CM

## 2024-07-25 DIAGNOSIS — R21 RASH: ICD-10-CM

## 2024-07-25 DIAGNOSIS — M47.892 OTHER OSTEOARTHRITIS OF SPINE, CERVICAL REGION: ICD-10-CM

## 2024-07-25 DIAGNOSIS — G62.9 PERIPHERAL POLYNEUROPATHY: ICD-10-CM

## 2024-07-25 DIAGNOSIS — L50.9 URTICARIA: ICD-10-CM

## 2024-07-25 DIAGNOSIS — Z00.00 MEDICARE ANNUAL WELLNESS VISIT, SUBSEQUENT: Primary | ICD-10-CM

## 2024-07-25 DIAGNOSIS — R73.01 IFG (IMPAIRED FASTING GLUCOSE): ICD-10-CM

## 2024-07-25 LAB
AMPHET+METHAMPHET UR QL: NEGATIVE
BARBITURATES UR QL SCN: NEGATIVE
BENZODIAZ UR QL SCN: NEGATIVE
CANNABINOIDS SERPL QL: NEGATIVE
COCAINE UR QL: NEGATIVE
FENTANYL UR-MCNC: NEGATIVE NG/ML
HBA1C MFR BLD: 5.8 % (ref 4.8–5.6)
METHADONE UR QL SCN: NEGATIVE
OPIATES UR QL: NEGATIVE
OXYCODONE UR QL SCN: NEGATIVE

## 2024-07-25 PROCEDURE — 80307 DRUG TEST PRSMV CHEM ANLYZR: CPT | Performed by: INTERNAL MEDICINE

## 2024-07-25 PROCEDURE — 83036 HEMOGLOBIN GLYCOSYLATED A1C: CPT | Performed by: INTERNAL MEDICINE

## 2024-07-25 RX ORDER — FAMOTIDINE 20 MG/1
20 TABLET, FILM COATED ORAL 2 TIMES DAILY
Qty: 90 TABLET | Refills: 3 | Status: SHIPPED | OUTPATIENT
Start: 2024-07-25

## 2024-07-25 RX ORDER — TRIAMCINOLONE ACETONIDE 1 MG/G
1 CREAM TOPICAL 2 TIMES DAILY
Qty: 454 G | Refills: 2 | Status: SHIPPED | OUTPATIENT
Start: 2024-07-25

## 2024-07-25 RX ORDER — METHYLPREDNISOLONE ACETATE 40 MG/ML
40 INJECTION, SUSPENSION INTRA-ARTICULAR; INTRALESIONAL; INTRAMUSCULAR; SOFT TISSUE ONCE
Status: SHIPPED | OUTPATIENT
Start: 2024-07-25

## 2024-07-25 RX ORDER — CETIRIZINE HYDROCHLORIDE 5 MG/1
5 TABLET ORAL DAILY
Qty: 90 TABLET | Refills: 3 | Status: SHIPPED | OUTPATIENT
Start: 2024-07-25

## 2024-07-25 RX ORDER — LISINOPRIL 10 MG/1
10 TABLET ORAL DAILY
Qty: 90 TABLET | Refills: 3 | Status: SHIPPED | OUTPATIENT
Start: 2024-07-25

## 2024-07-25 RX ORDER — TRAMADOL HYDROCHLORIDE 50 MG/1
50 TABLET ORAL 3 TIMES DAILY PRN
Qty: 90 TABLET | Refills: 5 | Status: SHIPPED | OUTPATIENT
Start: 2024-07-25

## 2024-07-25 NOTE — PROGRESS NOTES
"CHIEF COMPLAINT/ HPI:  Medicare Wellness-subsequent (Wellness, Lab follow up. Skin mole check, pt states fatigue and weakness asking for A1c check, Consult on vaccine Covid. Bilateral ear check. Concern on med dosages regarding weight loss. Tramadol needs to be sent to [facility]. )    Patient is here to discuss wax in her ears and itching and rash that she has and it itches, been going on for several months    Weight loss, concerns about her sugars      Objective   Vital Signs  Vitals:    07/25/24 1057   BP: 138/70   Temp: 98.2 °F (36.8 °C)   SpO2: 94%   Weight: 49 kg (108 lb)   Height: 149.9 cm (59.02\")      Body mass index is 21.8 kg/m².  Review of Systems   Constitutional: Negative.    HENT: Negative.     Eyes: Negative.    Respiratory: Negative.     Cardiovascular: Negative.    Gastrointestinal: Negative.    Endocrine: Negative.    Genitourinary: Negative.    Musculoskeletal: Negative.    Skin:  Positive for rash.   Allergic/Immunologic: Negative.    Neurological: Negative.    Hematological: Negative.    Psychiatric/Behavioral: Negative.        Physical Exam  Constitutional:       General: She is not in acute distress.     Appearance: Normal appearance.   HENT:      Head: Normocephalic.      Mouth/Throat:      Mouth: Mucous membranes are moist.   Eyes:      Conjunctiva/sclera: Conjunctivae normal.      Pupils: Pupils are equal, round, and reactive to light.   Cardiovascular:      Rate and Rhythm: Normal rate and regular rhythm.      Pulses: Normal pulses.      Heart sounds: Normal heart sounds.   Pulmonary:      Effort: Pulmonary effort is normal.      Breath sounds: Normal breath sounds.   Musculoskeletal:         General: No swelling. Normal range of motion.      Cervical back: Neck supple.   Skin:     General: Skin is warm and dry.      Coloration: Skin is not jaundiced.   Neurological:      General: No focal deficit present.      Mental Status: She is alert and oriented to person, place, and time. " "Mental status is at baseline.   Psychiatric:         Mood and Affect: Mood normal.         Behavior: Behavior normal.         Thought Content: Thought content normal.         Judgment: Judgment normal.     Patient has a rash on the left lower leg 1 area is a little more reddened but not opened on the left lower medial lower leg, she has several areas of excoriation on the arms, she has 1 area of raised red irritated slightly peeling skin on the left neck area, and she has some excoriations on the upper back area along with multiple seborrheic keratosis,  Result Review :   No results found for: \"PROBNP\", \"BNP\"  CMP          1/22/2024    14:12 7/18/2024    12:07   CMP   Glucose 73  84    BUN 15  14    Creatinine 0.70  0.73    EGFR 82.3  78.2    Sodium 138  141    Potassium 4.0  4.5    Chloride 103  106    Calcium 9.3  9.6    Total Protein 6.3  5.9    Albumin 3.7  3.7    Globulin 2.6  2.2    Total Bilirubin 0.3  0.2    Alkaline Phosphatase 67  64    AST (SGOT) 16  17    ALT (SGPT) 8  9    Albumin/Globulin Ratio 1.4  1.7    BUN/Creatinine Ratio 21.4  19.2    Anion Gap 9.0  7.5      CBC w/diff          1/22/2024    14:12 7/18/2024    12:07   CBC w/Diff   WBC 6.31  7.26    RBC 4.16  4.07    Hemoglobin 12.0  12.0    Hematocrit 36.8  36.7    MCV 88.5  90.2    MCH 28.8  29.5    MCHC 32.6  32.7    RDW 12.7  12.1    Platelets 305  269    Neutrophil Rel % 64.3  73.9    Immature Granulocyte Rel % 0.3  0.3    Lymphocyte Rel % 23.5  16.4    Monocyte Rel % 10.0  7.6    Eosinophil Rel % 1.6  1.4    Basophil Rel % 0.3  0.4          Lab Results   Component Value Date    TSH 2.280 07/18/2024    TSH 3.640 06/17/2021    TSH 2.750 11/12/2020      Lab Results   Component Value Date    FREET4 1.08 07/18/2024    FREET4 1.2 11/12/2020                          Visit Diagnoses:    ICD-10-CM ICD-9-CM   1. Medicare annual wellness visit, subsequent  Z00.00 V70.0   2. Peripheral polyneuropathy  G62.9 356.9   3. Urticaria  L50.9 708.9   4. Rash  " R21 782.1   5. Other osteoarthritis of spine, cervical region  M47.892 721.0   6. Vitamin D deficiency  E55.9 268.9   7. Hypertension, essential  I10 401.9   8. IFG (impaired fasting glucose)  R73.01 790.21       Assessment and Plan   Diagnoses and all orders for this visit:    1. Medicare annual wellness visit, subsequent (Primary)  -     traMADol (ULTRAM) 50 MG tablet; Take 1 tablet by mouth 3 (Three) Times a Day As Needed for Moderate Pain. for pain  Dispense: 90 tablet; Refill: 5  -     triamcinolone (KENALOG) 0.1 % cream; Apply 1 Application topically to the appropriate area as directed 2 (Two) Times a Day.  Dispense: 454 g; Refill: 2  -     methylPREDNISolone acetate (DEPO-medrol) injection 40 mg  -     CBC & Differential; Future  -     Comprehensive Metabolic Panel; Future  -     Vitamin B12 anemia; Future  -     Folate anemia; Future  -     Urine Drug Screen - Urine, Clean Catch; Future  -     Hemoglobin A1c; Future    2. Peripheral polyneuropathy  -     traMADol (ULTRAM) 50 MG tablet; Take 1 tablet by mouth 3 (Three) Times a Day As Needed for Moderate Pain. for pain  Dispense: 90 tablet; Refill: 5  -     triamcinolone (KENALOG) 0.1 % cream; Apply 1 Application topically to the appropriate area as directed 2 (Two) Times a Day.  Dispense: 454 g; Refill: 2  -     methylPREDNISolone acetate (DEPO-medrol) injection 40 mg  -     CBC & Differential; Future  -     Comprehensive Metabolic Panel; Future  -     Vitamin B12 anemia; Future  -     Folate anemia; Future  -     Urine Drug Screen - Urine, Clean Catch; Future  -     Hemoglobin A1c; Future    3. Urticaria  -     traMADol (ULTRAM) 50 MG tablet; Take 1 tablet by mouth 3 (Three) Times a Day As Needed for Moderate Pain. for pain  Dispense: 90 tablet; Refill: 5  -     triamcinolone (KENALOG) 0.1 % cream; Apply 1 Application topically to the appropriate area as directed 2 (Two) Times a Day.  Dispense: 454 g; Refill: 2  -     methylPREDNISolone acetate (DEPO-medrol)  injection 40 mg  -     CBC & Differential; Future  -     Comprehensive Metabolic Panel; Future  -     Vitamin B12 anemia; Future  -     Folate anemia; Future  -     Urine Drug Screen - Urine, Clean Catch; Future  -     Hemoglobin A1c; Future    4. Rash  -     traMADol (ULTRAM) 50 MG tablet; Take 1 tablet by mouth 3 (Three) Times a Day As Needed for Moderate Pain. for pain  Dispense: 90 tablet; Refill: 5  -     triamcinolone (KENALOG) 0.1 % cream; Apply 1 Application topically to the appropriate area as directed 2 (Two) Times a Day.  Dispense: 454 g; Refill: 2  -     methylPREDNISolone acetate (DEPO-medrol) injection 40 mg  -     CBC & Differential; Future  -     Comprehensive Metabolic Panel; Future  -     Vitamin B12 anemia; Future  -     Folate anemia; Future  -     Urine Drug Screen - Urine, Clean Catch; Future  -     Hemoglobin A1c; Future    5. Other osteoarthritis of spine, cervical region  -     traMADol (ULTRAM) 50 MG tablet; Take 1 tablet by mouth 3 (Three) Times a Day As Needed for Moderate Pain. for pain  Dispense: 90 tablet; Refill: 5  -     triamcinolone (KENALOG) 0.1 % cream; Apply 1 Application topically to the appropriate area as directed 2 (Two) Times a Day.  Dispense: 454 g; Refill: 2  -     methylPREDNISolone acetate (DEPO-medrol) injection 40 mg  -     CBC & Differential; Future  -     Comprehensive Metabolic Panel; Future  -     Vitamin B12 anemia; Future  -     Folate anemia; Future  -     Urine Drug Screen - Urine, Clean Catch; Future  -     Hemoglobin A1c; Future    6. Vitamin D deficiency  -     traMADol (ULTRAM) 50 MG tablet; Take 1 tablet by mouth 3 (Three) Times a Day As Needed for Moderate Pain. for pain  Dispense: 90 tablet; Refill: 5  -     triamcinolone (KENALOG) 0.1 % cream; Apply 1 Application topically to the appropriate area as directed 2 (Two) Times a Day.  Dispense: 454 g; Refill: 2  -     methylPREDNISolone acetate (DEPO-medrol) injection 40 mg  -     CBC & Differential;  Future  -     Comprehensive Metabolic Panel; Future  -     Vitamin B12 anemia; Future  -     Folate anemia; Future  -     Urine Drug Screen - Urine, Clean Catch; Future  -     Hemoglobin A1c; Future    7. Hypertension, essential  -     traMADol (ULTRAM) 50 MG tablet; Take 1 tablet by mouth 3 (Three) Times a Day As Needed for Moderate Pain. for pain  Dispense: 90 tablet; Refill: 5  -     triamcinolone (KENALOG) 0.1 % cream; Apply 1 Application topically to the appropriate area as directed 2 (Two) Times a Day.  Dispense: 454 g; Refill: 2  -     methylPREDNISolone acetate (DEPO-medrol) injection 40 mg  -     CBC & Differential; Future  -     Comprehensive Metabolic Panel; Future  -     Vitamin B12 anemia; Future  -     Folate anemia; Future  -     Urine Drug Screen - Urine, Clean Catch; Future  -     Hemoglobin A1c; Future    8. IFG (impaired fasting glucose)  -     traMADol (ULTRAM) 50 MG tablet; Take 1 tablet by mouth 3 (Three) Times a Day As Needed for Moderate Pain. for pain  Dispense: 90 tablet; Refill: 5  -     triamcinolone (KENALOG) 0.1 % cream; Apply 1 Application topically to the appropriate area as directed 2 (Two) Times a Day.  Dispense: 454 g; Refill: 2  -     methylPREDNISolone acetate (DEPO-medrol) injection 40 mg  -     CBC & Differential; Future  -     Comprehensive Metabolic Panel; Future  -     Vitamin B12 anemia; Future  -     Folate anemia; Future  -     cetirizine (zyrTEC) 5 MG tablet; Take 1 tablet by mouth Daily.  Dispense: 90 tablet; Refill: 3  -     famotidine (Pepcid) 20 MG tablet; Take 1 tablet by mouth 2 (Two) Times a Day.  Dispense: 90 tablet; Refill: 3  -     Urine Drug Screen - Urine, Clean Catch; Future  -     lisinopril (PRINIVIL,ZESTRIL) 10 MG tablet; Take 1 tablet by mouth Daily.  Dispense: 90 tablet; Refill: 3  -     Hemoglobin A1c; Future    Rash dry skin eczema lower back, continue using topical cortisone creams, January 2024 consider a Depo-Medrol shot July 2024, consider  dermatology referral medication effects, consider Pepcid, Zyrtec    Annual wellness visit completed July 18, 2023 and again July 25, 2024    B12 mild deficiency July 18, 2024    Vitamin D deficiency mild okay to continue taking over-the-counter    Left leg ulcer , venous changes mid PreTAB medial area, patient did see the wound care center Dr. Veronica, has been released, leg ulcer has been improving as of January 2024, no further follow-up, area on the left lower leg continues to be irritated but not opened, continue topical steroid cream and    Herpes zoster, trigeminal branch mandibular branch left cheek,  May 2020,     Osteoarthritis of the neck of neuropathy, left neck mainly, continues on gabapentin 3 times daily,, tramadol, 3 times daily, continues--will refill but also can substitute Tylenol for the tramadol, urine drug screen probably needs to be done at some point    Vitamin D deficiency    Hypertension continues  lisinopril 20 mg daily, Toprol-XL 25 mg daily    Chronic pain syndrome chronic right hip pain low back pain---cont  on tramadol 3 times daily, gabapentin  tid as above    Previous cataract surgery    Glaucoma    appendectomy,  right total hip arthroplasty 2000    spinal stenosis    kidney stone with UTI and sepsis December 2017 in the hospital        Follow Up   Return in about 6 months (around 1/25/2025).  Patient was given instructions and counseling regarding her condition or for health maintenance advice. Please see specific information pulled into the AVS if appropriate.

## 2024-07-25 NOTE — PROGRESS NOTES
Subjective   The ABCs of the Annual Wellness Visit  Medicare Wellness Visit      Liudmila Liu is a 89 y.o. patient who presents for a Medicare Wellness Visit.    The following portions of the patient's history were reviewed and   updated as appropriate: allergies, current medications, past family history, past medical history, past social history, past surgical history, and problem list.    Compared to one year ago, the patient's physical   health is the same.  Compared to one year ago, the patient's mental   health is the same.    Recent Hospitalizations:  She was not admitted to the hospital during the last year.     Current Medical Providers:  Patient Care Team:  Iron Martinez MD as PCP - General (Internal Medicine)    Outpatient Medications Prior to Visit   Medication Sig Dispense Refill    brimonidine-timolol (Combigan) 0.2-0.5 % ophthalmic solution 1 drop.      gabapentin (NEURONTIN) 600 MG tablet Take 1 tablet by mouth 3 (Three) Times a Day. 360 tablet 1    ketoconazole (NIZORAL) 2 % shampoo Apply  topically to the appropriate area as directed 2 (Two) Times a Week. 240 mL 5    latanoprost (XALATAN) 0.005 % ophthalmic solution latanoprost 0.005 % eye drops      lisinopril (PRINIVIL,ZESTRIL) 20 MG tablet TAKE 1 TABLET DAILY 90 tablet 3    metoprolol tartrate (LOPRESSOR) 25 MG tablet TAKE 1 TABLET DAILY 90 tablet 3    pantoprazole (PROTONIX) 40 MG EC tablet TAKE 1 TABLET DAILY 90 tablet 3    traMADol (ULTRAM) 50 MG tablet Take 1 tablet by mouth 3 (Three) Times a Day As Needed for Moderate Pain. for pain 90 tablet 5    triamcinolone (KENALOG) 0.1 % cream Apply 1 Application topically to the appropriate area as directed 2 (Two) Times a Day. 454 g 2    Wound Dressings (Medihoney Wound &Burn Dressing) paste Apply 1 application  topically Daily. (Patient not taking: Reported on 7/25/2024) 103 mL 1    alclometasone (ACLOVATE) 0.05 % cream Apply 1 Application topically to the appropriate area as  "directed 2 (Two) Times a Day. (Patient not taking: Reported on 7/25/2024)      Nirmatrelvir&Ritonavir 150/100 (Paxlovid, 150/100,) 10 x 150 MG & 10 x 100MG tablet therapy pack tablet (for renal adjustment) Take 2 tablets by mouth 2 (Two) Times a Day. (Patient not taking: Reported on 7/25/2024) 20 each 0     No facility-administered medications prior to visit.     Opioid medication/s are on active medication list.  and I have evaluated her active treatment plan and pain score trends (see table).  There were no vitals filed for this visit.  I have reviewed the chart for potential of high risk medication and harmful drug interactions in the elderly.        Aspirin is not on active medication list.  Aspirin use is not indicated based on review of current medical condition/s. Risk of harm outweighs potential benefits.  .    Patient Active Problem List   Diagnosis    Spinal osteoarthritis    Trigeminal herpes zoster    Vitamin D deficiency    Hypertension, essential    Peripheral polyneuropathy    Medicare annual wellness visit, subsequent    Ulcer of left lower extremity, limited to breakdown of skin    Rash    Urticaria    COVID-19 virus infection    IFG (impaired fasting glucose)     Advance Care Planning (Click this link to access ACP Navigator)  Advance Directive is on file.  ACP discussion was held with the patient during this visit. Patient has an advance directive in EMR which is still valid.         Objective   Vitals:    07/25/24 1057   BP: 138/70   Temp: 98.2 °F (36.8 °C)   SpO2: 94%   Weight: 49 kg (108 lb)   Height: 149.9 cm (59.02\")       Estimated body mass index is 21.8 kg/m² as calculated from the following:    Height as of this encounter: 149.9 cm (59.02\").    Weight as of this encounter: 49 kg (108 lb).    BMI is within normal parameters. No other follow-up for BMI required.        Does the patient have evidence of cognitive impairment? No                                                                  "                              Health  Risk Assessment    Smoking Status:  Social History     Tobacco Use   Smoking Status Never   Smokeless Tobacco Never     Alcohol Consumption:  Social History     Substance and Sexual Activity   Alcohol Use Never     Fall Risk Screen:  MAGDAADI Fall Risk Assessment was completed, and patient is at LOW risk for falls.Assessment completed on:2024    Depression Screenin/25/2024    11:08 AM   PHQ-2/PHQ-9 Depression Screening   Little Interest or Pleasure in Doing Things 0-->not at all   Feeling Down, Depressed or Hopeless 0-->not at all   PHQ-9: Brief Depression Severity Measure Score 0     Health Habits and Functional and Cognitive Screenin/25/2024    11:09 AM   Functional & Cognitive Status   Do you have difficulty preparing food and eating? No   Do you have difficulty bathing yourself, getting dressed or grooming yourself? No   Do you have difficulty using the toilet? No   Do you have difficulty moving around from place to place? Yes   Do you have trouble with steps or getting out of a bed or a chair? Yes   Current Diet Well Balanced Diet   Dental Exam Up to date   Eye Exam Up to date   Exercise (times per week) 7 times per week   Current Exercises Include Walking   Do you need help using the phone?  No   Are you deaf or do you have serious difficulty hearing?  No   Do you need help to go to places out of walking distance? Yes   Do you need help shopping? Yes   Do you need help preparing meals?  Yes   Do you need help with housework?  Yes   Do you need help with laundry? Yes   Do you need help taking your medications? Yes   Do you need help managing money? Yes   Do you ever drive or ride in a car without wearing a seat belt? No   Have you felt unusual stress, anger or loneliness in the last month? No   Who do you live with? Alone   If you need help, do you have trouble finding someone available to you? No   Have you been bothered in the last four weeks by sexual  problems? No   Do you have difficulty concentrating, remembering or making decisions? No             Age-appropriate Screening Schedule:  Refer to the list below for future screening recommendations based on patient's age, sex and/or medical conditions. Orders for these recommended tests are listed in the plan section. The patient has been provided with a written plan.    Health Maintenance List  Health Maintenance   Topic Date Due    DXA SCAN  Never done    TDAP/TD VACCINES (1 - Tdap) Never done    RSV Vaccine - Adults (1 - 1-dose 60+ series) Never done    Pneumococcal Vaccine 65+ (2 of 2 - PCV) 01/20/2021    COVID-19 Vaccine (6 - 2023-24 season) 09/01/2023    INFLUENZA VACCINE  08/01/2024    ANNUAL WELLNESS VISIT  07/25/2025    ZOSTER VACCINE  Completed                                                                                                                                                CMS Preventative Services Quick Reference  Risk Factors Identified During Encounter  None Identified    The above risks/problems have been discussed with the patient.  Pertinent information has been shared with the patient in the After Visit Summary.  An After Visit Summary and PPPS were made available to the patient.    Follow Up:   Next Medicare Wellness visit to be scheduled in 1 year.     Assessment & Plan  Peripheral polyneuropathy    Urticaria    Rash    Other osteoarthritis of spine, cervical region    Vitamin D deficiency    Medicare annual wellness visit, subsequent    Hypertension, essential  Hypertension is stable and controlled  Continue current treatment regimen.  Medication changes per orders.  Blood pressure will be reassessed in 6 months.  IFG (impaired fasting glucose)    Orders Placed This Encounter   Procedures    Comprehensive Metabolic Panel    Vitamin B12 anemia    Folate anemia    Urine Drug Screen - Urine, Clean Catch    Hemoglobin A1c    CBC & Differential     New Medications Ordered This Visit    Medications    traMADol (ULTRAM) 50 MG tablet     Sig: Take 1 tablet by mouth 3 (Three) Times a Day As Needed for Moderate Pain. for pain     Dispense:  90 tablet     Refill:  5    triamcinolone (KENALOG) 0.1 % cream     Sig: Apply 1 Application topically to the appropriate area as directed 2 (Two) Times a Day.     Dispense:  454 g     Refill:  2    methylPREDNISolone acetate (DEPO-medrol) injection 40 mg    cetirizine (zyrTEC) 5 MG tablet     Sig: Take 1 tablet by mouth Daily.     Dispense:  90 tablet     Refill:  3    famotidine (Pepcid) 20 MG tablet     Sig: Take 1 tablet by mouth 2 (Two) Times a Day.     Dispense:  90 tablet     Refill:  3    lisinopril (PRINIVIL,ZESTRIL) 10 MG tablet     Sig: Take 1 tablet by mouth Daily.     Dispense:  90 tablet     Refill:  3          Follow Up:   Return in about 6 months (around 1/25/2025).

## 2024-07-25 NOTE — ASSESSMENT & PLAN NOTE
Hypertension is stable and controlled  Continue current treatment regimen.  Medication changes per orders.  Blood pressure will be reassessed in 6 months.   Patient came in for blood pressure check.  Reading was 144/62 completed on RUE .   Spoke with   in office and gave verbal on vitals obtained.   Provider's instructions are for patient to continue current dose of Amlodipine  And keep pending appointment in  July. Patient verbalized understanding.

## 2024-08-21 ENCOUNTER — TELEPHONE (OUTPATIENT)
Dept: INTERNAL MEDICINE | Age: 89
End: 2024-08-21
Payer: MEDICARE

## 2024-08-21 NOTE — TELEPHONE ENCOUNTER
Pt daughter called, stating her BP has been running very low 80/50 , she is having difficulty staying awake, numbness and tingling of rt hand. She has not had her BP medication today as they did not want her to have lower BP.    I made a appt for her 9/4    In the meantime what would you like for her to do.     Daughter # [phone]

## 2024-08-23 ENCOUNTER — OFFICE VISIT (OUTPATIENT)
Dept: INTERNAL MEDICINE | Age: 89
End: 2024-08-23
Payer: MEDICARE

## 2024-08-23 ENCOUNTER — NURSE TRIAGE (OUTPATIENT)
Dept: CALL CENTER | Facility: HOSPITAL | Age: 89
End: 2024-08-23
Payer: MEDICARE

## 2024-08-23 VITALS
BODY MASS INDEX: 21.77 KG/M2 | SYSTOLIC BLOOD PRESSURE: 90 MMHG | WEIGHT: 108 LBS | DIASTOLIC BLOOD PRESSURE: 60 MMHG | TEMPERATURE: 97.2 F | HEIGHT: 59 IN

## 2024-08-23 DIAGNOSIS — R53.1 WEAKNESS: Primary | ICD-10-CM

## 2024-08-23 DIAGNOSIS — I95.9 HYPOTENSION, UNSPECIFIED HYPOTENSION TYPE: ICD-10-CM

## 2024-08-23 LAB
BILIRUB BLD-MCNC: NEGATIVE MG/DL
CLARITY, POC: CLEAR
COLOR UR: YELLOW
EXPIRATION DATE: ABNORMAL
GLUCOSE UR STRIP-MCNC: NEGATIVE MG/DL
KETONES UR QL: NEGATIVE
LEUKOCYTE EST, POC: ABNORMAL
Lab: ABNORMAL
NITRITE UR-MCNC: NEGATIVE MG/ML
PH UR: 6 [PH] (ref 5–8)
PROT UR STRIP-MCNC: NEGATIVE MG/DL
RBC # UR STRIP: NEGATIVE /UL
SP GR UR: 1.01 (ref 1–1.03)
UROBILINOGEN UR QL: NORMAL

## 2024-08-23 PROCEDURE — 1126F AMNT PAIN NOTED NONE PRSNT: CPT | Performed by: NURSE PRACTITIONER

## 2024-08-23 PROCEDURE — 1159F MED LIST DOCD IN RCRD: CPT | Performed by: NURSE PRACTITIONER

## 2024-08-23 PROCEDURE — 93000 ELECTROCARDIOGRAM COMPLETE: CPT | Performed by: NURSE PRACTITIONER

## 2024-08-23 PROCEDURE — 1160F RVW MEDS BY RX/DR IN RCRD: CPT | Performed by: NURSE PRACTITIONER

## 2024-08-23 PROCEDURE — 81003 URINALYSIS AUTO W/O SCOPE: CPT | Performed by: NURSE PRACTITIONER

## 2024-08-23 PROCEDURE — 99214 OFFICE O/P EST MOD 30 MIN: CPT | Performed by: NURSE PRACTITIONER

## 2024-08-23 NOTE — PROGRESS NOTES
Chief Complaint  Hypotension (The patient is coming in for hypotension. She had a low blood pressure Tuesday at her eye doctor it was 80/52. Juan Gutierrez had advised patient be off lisinopril and hold metoprolol for twoi days. Last night was when the heart palpitations happened, this was around 6:30. She had a blood pressure of 159/90 heart rate 64,at this time. )  Subjective      History of Present Illness  Liudmila Liu is a 89 y.o. female patient of Dr. Maldonado Martinez who presents to [facility] accompanied by family members for an acute visit for complaint of weakness and low blood pressure.  The patient was at her eye doctor 4 days ago and reports her blood pressure was 80/52.  Dr. Martinez advised patient to stop her lisinopril and hold her metoprolol for 2 days.  She is due to take a dose tonight.  She reports that last night she did have heart palpitations and heart rate 64 with a blood pressure of 159/90.  The patient has had some intermittent dizziness.  The patient denies any chest pain or shortness of air.    Past Medical History:   Diagnosis Date    Age-related osteoporosis without current pathological fracture     Congestive heart failure (CHF)     UNSPECIFIED, HF CHRONICITY, UNSPECIFIED HEART FAILURE TYPE    Difficulty in walking, not elsewhere classified     Diverticulitis     Essential (primary) hypertension     Gastric ulcer 01/2020    Heart failure, unspecified     Hip pain, right     Hypothyroidism, unspecified     OA (osteoarthritis)     Osteoporosis     Spinal stenosis     Vitamin D deficiency, unspecified         Past Surgical History:   Procedure Laterality Date    APPENDECTOMY      CATARACT EXTRACTION      KIDNEY STONE SURGERY  01/2018    TOTAL HIP ARTHROPLASTY REVISION Right 2000    WISDOM TOOTH EXTRACTION  2020    UPPER        Social History     Tobacco Use   Smoking Status Never   Smokeless Tobacco Never        Patient Care Team:  Iron Martinez  "MD KOMAL as PCP - General (Internal Medicine)    Allergies   Allergen Reactions    Sulfa Antibiotics Rash          Current Outpatient Medications:     brimonidine-timolol (Combigan) 0.2-0.5 % ophthalmic solution, 1 drop., Disp: , Rfl:     cetirizine (zyrTEC) 5 MG tablet, Take 1 tablet by mouth Daily., Disp: 90 tablet, Rfl: 3    famotidine (Pepcid) 20 MG tablet, Take 1 tablet by mouth 2 (Two) Times a Day., Disp: 90 tablet, Rfl: 3    gabapentin (NEURONTIN) 600 MG tablet, Take 1 tablet by mouth 3 (Three) Times a Day., Disp: 360 tablet, Rfl: 1    ketoconazole (NIZORAL) 2 % shampoo, Apply  topically to the appropriate area as directed 2 (Two) Times a Week., Disp: 240 mL, Rfl: 5    latanoprost (XALATAN) 0.005 % ophthalmic solution, latanoprost 0.005 % eye drops, Disp: , Rfl:     metoprolol tartrate (LOPRESSOR) 25 MG tablet, TAKE 1 TABLET DAILY, Disp: 90 tablet, Rfl: 3    pantoprazole (PROTONIX) 40 MG EC tablet, TAKE 1 TABLET DAILY, Disp: 90 tablet, Rfl: 3    traMADol (ULTRAM) 50 MG tablet, Take 1 tablet by mouth 3 (Three) Times a Day As Needed for Moderate Pain. for pain, Disp: 90 tablet, Rfl: 5    triamcinolone (KENALOG) 0.1 % cream, Apply 1 Application topically to the appropriate area as directed 2 (Two) Times a Day., Disp: 454 g, Rfl: 2    lisinopril (PRINIVIL,ZESTRIL) 10 MG tablet, Take 1 tablet by mouth Daily. (Patient not taking: Reported on 8/23/2024), Disp: 90 tablet, Rfl: 3    Wound Dressings (Medihoney Wound &Burn Dressing) paste, Apply 1 application  topically Daily. (Patient not taking: Reported on 7/25/2024), Disp: 103 mL, Rfl: 1  No current facility-administered medications for this visit.    Objective     Vitals:    08/23/24 1504   BP: 90/60   BP Location: Right arm   Patient Position: Sitting   Cuff Size: Adult   Temp: 97.2 °F (36.2 °C)   TempSrc: Temporal   Weight: 49 kg (108 lb)   Height: 149.9 cm (59.02\")        BP Readings from Last 3 Encounters:   08/23/24 90/60   07/25/24 138/70   01/31/24 147/77    "     Physical Exam  Vitals reviewed.   Constitutional:       General: She is not in acute distress.  HENT:      Head: Normocephalic and atraumatic.      Mouth/Throat:      Mouth: Mucous membranes are moist.   Cardiovascular:      Rate and Rhythm: Normal rate and regular rhythm.      Heart sounds: Normal heart sounds.   Pulmonary:      Effort: Pulmonary effort is normal.      Breath sounds: Normal breath sounds. No wheezing, rhonchi or rales.   Abdominal:      General: There is no distension.      Palpations: Abdomen is soft. There is no mass.      Tenderness: There is no abdominal tenderness. There is no right CVA tenderness or left CVA tenderness.   Musculoskeletal:      Right lower leg: No edema.      Left lower leg: No edema.   Skin:     General: Skin is warm and dry.   Neurological:      General: No focal deficit present.      Mental Status: She is alert.   Psychiatric:         Thought Content: Thought content normal.          Result Review   The following data was reviewed by: ELENA Adler on 08/23/2024:  [x]  Tests & Results  []  Hospitalization/Emergency Department/Urgent Care  []  Internal/External Consultant Notes       Assessment and Plan     Diagnoses and all orders for this visit:    1. Weakness (Primary)  -     ECG 12 Lead  -     POC Urinalysis Dipstick, Automated    2. Hypotension, unspecified hypotension type  -     ECG 12 Lead           ECG 12 Lead    Date/Time: 8/23/2024 3:36 PM  Performed by: Samantha Marte APRN    Authorized by: Samantha Marte APRN  Comparison: not compared with previous ECG   Rhythm: sinus rhythm  Rate: normal  BPM: 62  Conduction: conduction normal  ST Segments: ST segments normal  T Waves: T waves normal    Clinical impression: normal ECG      Urinalysis in office today with only trace of leukocytes otherwise normal.  The patient was advised to hydrate well and resume her metoprolol 25 mg this evening.  Remain off lisinopril.  Monitor symptoms and return if not  improving.    Patient was given instructions and counseling regarding her condition or for health maintenance advice. Please see specific information pulled into the AVS if appropriate.     Follow Up   Return for Next scheduled follow up, or if symptoms worsen or fail to improve.    Please note that portions of this documentation were completed with a voice recognition program.    ELENA Adler

## 2024-08-23 NOTE — TELEPHONE ENCOUNTER
"Call transferred from [facility].  Caller states that her  mother's BP had been running low and her PCP stopped the lisinopril until her next appt and also wanted her to hold the metoprolol for 2 days.  Her mother called her this am and stated that she felt like her heart was pounding..  Caller went to her mother;s and her BP was 159/60 P 64, no longer feels that her heart is pounding.  Warm transfer to [place] at [facility] redirect line.  Reason for Disposition  • Requesting regular office appointment    Additional Information  • Negative: [1] Caller is not with the adult (patient) AND [2] reporting urgent symptoms  • Negative: Lab result questions  • Negative: Medication questions  • Negative: Caller can't be reached by phone  • Negative: Caller has already spoken to PCP or another triager  • Negative: RN needs further essential information from caller in order to complete triage    Answer Assessment - Initial Assessment Questions  1. REASON FOR CALL or QUESTION: \"What is your reason for calling today?\" or \"How can I best help you?\" or \"What question do you have that I can help answer?\"      appt    Protocols used: Information Only Call-ADULT-    "

## 2024-09-04 ENCOUNTER — OFFICE VISIT (OUTPATIENT)
Dept: INTERNAL MEDICINE | Age: 89
End: 2024-09-04
Payer: MEDICARE

## 2024-09-04 VITALS
WEIGHT: 106 LBS | BODY MASS INDEX: 21.37 KG/M2 | HEIGHT: 59 IN | TEMPERATURE: 98.2 F | DIASTOLIC BLOOD PRESSURE: 53 MMHG | HEART RATE: 60 BPM | SYSTOLIC BLOOD PRESSURE: 96 MMHG | OXYGEN SATURATION: 94 %

## 2024-09-04 DIAGNOSIS — I10 HYPERTENSION, ESSENTIAL: ICD-10-CM

## 2024-09-04 DIAGNOSIS — I95.2 HYPOTENSION DUE TO DRUGS: Primary | ICD-10-CM

## 2024-09-04 DIAGNOSIS — G62.9 PERIPHERAL POLYNEUROPATHY: ICD-10-CM

## 2024-09-04 DIAGNOSIS — E55.9 VITAMIN D DEFICIENCY: ICD-10-CM

## 2024-09-04 DIAGNOSIS — R73.01 IFG (IMPAIRED FASTING GLUCOSE): ICD-10-CM

## 2024-09-04 PROCEDURE — 1126F AMNT PAIN NOTED NONE PRSNT: CPT | Performed by: INTERNAL MEDICINE

## 2024-09-04 PROCEDURE — 99214 OFFICE O/P EST MOD 30 MIN: CPT | Performed by: INTERNAL MEDICINE

## 2024-09-04 RX ORDER — METOPROLOL SUCCINATE 25 MG/1
25 TABLET, EXTENDED RELEASE ORAL DAILY
Qty: 90 TABLET | Refills: 3 | Status: SHIPPED | OUTPATIENT
Start: 2024-09-04

## 2024-09-04 NOTE — PROGRESS NOTES
"CHIEF COMPLAINT/ HPI:  Hypertension (Pt has been having some issues with her BP running high and low. Pt saw Samantha and she did some adjustments to meds. Pt has BP log with her to look at. )    Back on metoprolol due to heart pounding pressure in the chest when she stopped it for a couple days, still off the lisinopril Home blood pressures reviewed,          Objective   Vital Signs  Vitals:    09/04/24 1046   BP: 96/53   Pulse: 60   Temp: 98.2 °F (36.8 °C)   SpO2: 94%   Weight: 48.1 kg (106 lb)   Height: 149.9 cm (59.02\")      Body mass index is 21.4 kg/m².  Review of Systems   Constitutional: Negative.    HENT: Negative.     Eyes: Negative.    Respiratory: Negative.     Cardiovascular: Negative.    Gastrointestinal: Negative.    Endocrine: Negative.    Genitourinary: Negative.    Musculoskeletal: Negative.    Allergic/Immunologic: Negative.    Neurological: Negative.    Hematological: Negative.    Psychiatric/Behavioral: Negative.      General Weakness, no syncope  Physical Exam  Constitutional:       General: She is not in acute distress.     Appearance: Normal appearance.   HENT:      Head: Normocephalic.   Cardiovascular:      Rate and Rhythm: Normal rate and regular rhythm.      Pulses: Normal pulses.      Heart sounds: Normal heart sounds.   Pulmonary:      Effort: Pulmonary effort is normal.      Breath sounds: Normal breath sounds.   Musculoskeletal:         General: No swelling. Normal range of motion.      Cervical back: Neck supple.   Skin:     General: Skin is warm and dry.      Coloration: Skin is not jaundiced.   Neurological:      General: No focal deficit present.      Mental Status: She is alert and oriented to person, place, and time. Mental status is at baseline.   Psychiatric:         Mood and Affect: Mood normal.         Behavior: Behavior normal.         Thought Content: Thought content normal.         Judgment: Judgment normal.        Result Review :   No results found for: \"PROBNP\", " "\"BNP\"  CMP          1/22/2024    14:12 7/18/2024    12:07   CMP   Glucose 73  84    BUN 15  14    Creatinine 0.70  0.73    EGFR 82.3  78.2    Sodium 138  141    Potassium 4.0  4.5    Chloride 103  106    Calcium 9.3  9.6    Total Protein 6.3  5.9    Albumin 3.7  3.7    Globulin 2.6  2.2    Total Bilirubin 0.3  0.2    Alkaline Phosphatase 67  64    AST (SGOT) 16  17    ALT (SGPT) 8  9    Albumin/Globulin Ratio 1.4  1.7    BUN/Creatinine Ratio 21.4  19.2    Anion Gap 9.0  7.5      CBC w/diff          1/22/2024    14:12 7/18/2024    12:07   CBC w/Diff   WBC 6.31  7.26    RBC 4.16  4.07    Hemoglobin 12.0  12.0    Hematocrit 36.8  36.7    MCV 88.5  90.2    MCH 28.8  29.5    MCHC 32.6  32.7    RDW 12.7  12.1    Platelets 305  269    Neutrophil Rel % 64.3  73.9    Immature Granulocyte Rel % 0.3  0.3    Lymphocyte Rel % 23.5  16.4    Monocyte Rel % 10.0  7.6    Eosinophil Rel % 1.6  1.4    Basophil Rel % 0.3  0.4          Lab Results   Component Value Date    TSH 2.280 07/18/2024    TSH 3.640 06/17/2021    TSH 2.750 11/12/2020      Lab Results   Component Value Date    FREET4 1.08 07/18/2024    FREET4 1.2 11/12/2020      A1C Last 3 Results          7/25/2024    13:28   HGBA1C Last 3 Results   Hemoglobin A1C 5.80                        Visit Diagnoses:    ICD-10-CM ICD-9-CM   1. Hypotension due to drugs  I95.2 458.8     E947.9   2. Hypertension, essential  I10 401.9   3. Peripheral polyneuropathy  G62.9 356.9   4. IFG (impaired fasting glucose)  R73.01 790.21   5. Vitamin D deficiency  E55.9 268.9       Assessment and Plan   Diagnoses and all orders for this visit:    1. Hypotension due to drugs (Primary)    2. Hypertension, essential    3. Peripheral polyneuropathy    4. IFG (impaired fasting glucose)    5. Vitamin D deficiency    Other orders  -     metoprolol succinate XL (Toprol XL) 25 MG 24 hr tablet; Take 1 tablet by mouth Daily.  Dispense: 90 tablet; Refill: 3    Weakness fatigue low blood pressures hypotension most " likely due to medications recently stopped lisinopril, came and saw nurse practitioner August 23, 2024, and has held blood pressure metoprolol some, but is now back on it,---pt will stay off lisinopirl for now and decrease gabapentin to 300 mg 3 times daily versus 603 times a day, September 4, 2024    Annual wellness visit completed July 25, 2024    B12 mild deficiency July 18, 2024--- continue B12 supplement    Vitamin D deficiency mild okay to continue taking over-the-counter    Osteoarthritis of the neck of neuropathy, left neck mainly, continues on gabapentin 3 times daily,, tramadol, 3 times daily, continues--will refill but also can substitute Tylenol for the tramadol, urine drug screen probably needs to be done at some point    Vitamin D deficiency    Chronic pain syndrome chronic right hip pain low back pain---cont  on tramadol 3 times daily, gabapentin  600 tid as above    Glaucoma    appendectomy,  right total hip arthroplasty 2000, cataract surgery    spinal stenosis    Follow Up   Return for Next scheduled follow up.  Patient was given instructions and counseling regarding her condition or for health maintenance advice. Please see specific information pulled into the AVS if appropriate.

## 2024-10-24 ENCOUNTER — CLINICAL SUPPORT (OUTPATIENT)
Dept: INTERNAL MEDICINE | Age: 89
End: 2024-10-24
Payer: MEDICARE

## 2024-10-24 DIAGNOSIS — Z23 NEED FOR INFLUENZA VACCINATION: Primary | ICD-10-CM

## 2024-10-24 PROCEDURE — G0008 ADMIN INFLUENZA VIRUS VAC: HCPCS | Performed by: INTERNAL MEDICINE

## 2024-10-24 PROCEDURE — 90662 IIV NO PRSV INCREASED AG IM: CPT | Performed by: INTERNAL MEDICINE

## 2024-10-30 DIAGNOSIS — G62.9 PERIPHERAL POLYNEUROPATHY: ICD-10-CM

## 2024-10-30 RX ORDER — KETOCONAZOLE 20 MG/ML
SHAMPOO TOPICAL
Qty: 240 ML | Refills: 1 | Status: SHIPPED | OUTPATIENT
Start: 2024-10-30

## 2024-10-30 RX ORDER — GABAPENTIN 600 MG/1
600 TABLET ORAL 3 TIMES DAILY
Qty: 270 TABLET | Refills: 1 | Status: SHIPPED | OUTPATIENT
Start: 2024-10-30

## 2024-12-13 RX ORDER — PANTOPRAZOLE SODIUM 40 MG/1
40 TABLET, DELAYED RELEASE ORAL DAILY
Qty: 90 TABLET | Refills: 3 | Status: SHIPPED | OUTPATIENT
Start: 2024-12-13

## 2025-01-23 ENCOUNTER — LAB (OUTPATIENT)
Dept: LAB | Facility: HOSPITAL | Age: OVER 89
End: 2025-01-23
Payer: MEDICARE

## 2025-01-23 DIAGNOSIS — R21 RASH: ICD-10-CM

## 2025-01-23 DIAGNOSIS — E55.9 VITAMIN D DEFICIENCY: ICD-10-CM

## 2025-01-23 DIAGNOSIS — L50.9 URTICARIA: ICD-10-CM

## 2025-01-23 DIAGNOSIS — M47.892 OTHER OSTEOARTHRITIS OF SPINE, CERVICAL REGION: ICD-10-CM

## 2025-01-23 DIAGNOSIS — Z00.00 MEDICARE ANNUAL WELLNESS VISIT, SUBSEQUENT: ICD-10-CM

## 2025-01-23 DIAGNOSIS — R73.01 IFG (IMPAIRED FASTING GLUCOSE): ICD-10-CM

## 2025-01-23 DIAGNOSIS — G62.9 PERIPHERAL POLYNEUROPATHY: ICD-10-CM

## 2025-01-23 DIAGNOSIS — I10 HYPERTENSION, ESSENTIAL: ICD-10-CM

## 2025-01-23 LAB
ALBUMIN SERPL-MCNC: 3.7 G/DL (ref 3.5–5.2)
ALBUMIN/GLOB SERPL: 1.5 G/DL
ALP SERPL-CCNC: 56 U/L (ref 39–117)
ALT SERPL W P-5'-P-CCNC: 12 U/L (ref 1–33)
ANION GAP SERPL CALCULATED.3IONS-SCNC: 8.9 MMOL/L (ref 5–15)
AST SERPL-CCNC: 21 U/L (ref 1–32)
BASOPHILS # BLD AUTO: 0.03 10*3/MM3 (ref 0–0.2)
BASOPHILS NFR BLD AUTO: 0.5 % (ref 0–1.5)
BILIRUB SERPL-MCNC: 0.3 MG/DL (ref 0–1.2)
BUN SERPL-MCNC: 15 MG/DL (ref 8–23)
BUN/CREAT SERPL: 20 (ref 7–25)
CALCIUM SPEC-SCNC: 9.4 MG/DL (ref 8.2–9.6)
CHLORIDE SERPL-SCNC: 105 MMOL/L (ref 98–107)
CO2 SERPL-SCNC: 27.1 MMOL/L (ref 22–29)
CREAT SERPL-MCNC: 0.75 MG/DL (ref 0.57–1)
DEPRECATED RDW RBC AUTO: 39.3 FL (ref 37–54)
EGFRCR SERPLBLD CKD-EPI 2021: 75.3 ML/MIN/1.73
EOSINOPHIL # BLD AUTO: 0.04 10*3/MM3 (ref 0–0.4)
EOSINOPHIL NFR BLD AUTO: 0.7 % (ref 0.3–6.2)
ERYTHROCYTE [DISTWIDTH] IN BLOOD BY AUTOMATED COUNT: 12 % (ref 12.3–15.4)
FOLATE SERPL-MCNC: 10.7 NG/ML (ref 4.78–24.2)
GLOBULIN UR ELPH-MCNC: 2.5 GM/DL
GLUCOSE SERPL-MCNC: 74 MG/DL (ref 65–99)
HCT VFR BLD AUTO: 37.5 % (ref 34–46.6)
HGB BLD-MCNC: 12.4 G/DL (ref 12–15.9)
IMM GRANULOCYTES # BLD AUTO: 0.02 10*3/MM3 (ref 0–0.05)
IMM GRANULOCYTES NFR BLD AUTO: 0.3 % (ref 0–0.5)
LYMPHOCYTES # BLD AUTO: 1.39 10*3/MM3 (ref 0.7–3.1)
LYMPHOCYTES NFR BLD AUTO: 23.7 % (ref 19.6–45.3)
MCH RBC QN AUTO: 29.9 PG (ref 26.6–33)
MCHC RBC AUTO-ENTMCNC: 33.1 G/DL (ref 31.5–35.7)
MCV RBC AUTO: 90.4 FL (ref 79–97)
MONOCYTES # BLD AUTO: 0.46 10*3/MM3 (ref 0.1–0.9)
MONOCYTES NFR BLD AUTO: 7.8 % (ref 5–12)
NEUTROPHILS NFR BLD AUTO: 3.92 10*3/MM3 (ref 1.7–7)
NEUTROPHILS NFR BLD AUTO: 67 % (ref 42.7–76)
NRBC BLD AUTO-RTO: 0 /100 WBC (ref 0–0.2)
PLATELET # BLD AUTO: 287 10*3/MM3 (ref 140–450)
PMV BLD AUTO: 10.2 FL (ref 6–12)
POTASSIUM SERPL-SCNC: 4.5 MMOL/L (ref 3.5–5.2)
PROT SERPL-MCNC: 6.2 G/DL (ref 6–8.5)
RBC # BLD AUTO: 4.15 10*6/MM3 (ref 3.77–5.28)
SODIUM SERPL-SCNC: 141 MMOL/L (ref 136–145)
VIT B12 BLD-MCNC: 354 PG/ML (ref 211–946)
WBC NRBC COR # BLD AUTO: 5.86 10*3/MM3 (ref 3.4–10.8)

## 2025-01-23 PROCEDURE — 80053 COMPREHEN METABOLIC PANEL: CPT

## 2025-01-23 PROCEDURE — 36415 COLL VENOUS BLD VENIPUNCTURE: CPT

## 2025-01-23 PROCEDURE — 85025 COMPLETE CBC W/AUTO DIFF WBC: CPT

## 2025-01-23 PROCEDURE — 82607 VITAMIN B-12: CPT

## 2025-01-23 PROCEDURE — 82746 ASSAY OF FOLIC ACID SERUM: CPT

## 2025-01-28 ENCOUNTER — OFFICE VISIT (OUTPATIENT)
Dept: INTERNAL MEDICINE | Age: OVER 89
End: 2025-01-28
Payer: MEDICARE

## 2025-01-28 ENCOUNTER — TELEPHONE (OUTPATIENT)
Dept: INTERNAL MEDICINE | Age: OVER 89
End: 2025-01-28

## 2025-01-28 VITALS
SYSTOLIC BLOOD PRESSURE: 116 MMHG | TEMPERATURE: 98.4 F | HEART RATE: 72 BPM | DIASTOLIC BLOOD PRESSURE: 78 MMHG | HEIGHT: 59 IN | BODY MASS INDEX: 21.77 KG/M2 | OXYGEN SATURATION: 84 % | WEIGHT: 108 LBS

## 2025-01-28 DIAGNOSIS — G62.9 PERIPHERAL POLYNEUROPATHY: ICD-10-CM

## 2025-01-28 DIAGNOSIS — M47.892 OTHER OSTEOARTHRITIS OF SPINE, CERVICAL REGION: Primary | ICD-10-CM

## 2025-01-28 DIAGNOSIS — E55.9 VITAMIN D DEFICIENCY: ICD-10-CM

## 2025-01-28 PROCEDURE — 99214 OFFICE O/P EST MOD 30 MIN: CPT | Performed by: INTERNAL MEDICINE

## 2025-01-28 PROCEDURE — 1126F AMNT PAIN NOTED NONE PRSNT: CPT | Performed by: INTERNAL MEDICINE

## 2025-01-28 RX ORDER — GABAPENTIN 600 MG/1
600 TABLET ORAL 2 TIMES DAILY
Qty: 60 TABLET | Refills: 5 | Status: SHIPPED | OUTPATIENT
Start: 2025-01-28

## 2025-01-28 RX ORDER — TRAMADOL HYDROCHLORIDE 50 MG/1
50 TABLET ORAL DAILY
Qty: 30 TABLET | Refills: 5 | Status: SHIPPED | OUTPATIENT
Start: 2025-01-28

## 2025-01-28 RX ORDER — METOPROLOL SUCCINATE 25 MG/1
25 TABLET, EXTENDED RELEASE ORAL DAILY
Qty: 90 TABLET | Refills: 3 | Status: SHIPPED | OUTPATIENT
Start: 2025-01-28

## 2025-01-28 NOTE — PROGRESS NOTES
"Hypertension (Routine follow up, Lab follow up. Pt states she is taking Tramadol once a day in the morning and Gabapentin BID one am and one pm. Medication refill on Metroprolol.Left ankle shin , look at wound , pt is using aquaphor. )    Objective   Vital Signs  Vitals:    01/28/25 1251   BP: 116/78   BP Location: Right arm   Patient Position: Sitting   Pulse: 72   Temp: 98.4 °F (36.9 °C)   SpO2: (!) 84%   Weight: 49 kg (108 lb)   Height: 149.9 cm (59.02\")      Body mass index is 21.8 kg/m².  Review of Systems   Constitutional: Negative.    HENT: Negative.     Eyes: Negative.    Respiratory: Negative.     Cardiovascular: Negative.    Gastrointestinal: Negative.    Endocrine: Negative.    Genitourinary: Negative.    Musculoskeletal: Negative.    Allergic/Immunologic: Negative.    Neurological: Negative.    Hematological: Negative.    Psychiatric/Behavioral: Negative.        Physical Exam  Constitutional:       General: She is not in acute distress.     Appearance: Normal appearance.   HENT:      Head: Normocephalic.   Cardiovascular:      Rate and Rhythm: Normal rate and regular rhythm.      Pulses: Normal pulses.      Heart sounds: Normal heart sounds.   Pulmonary:      Effort: Pulmonary effort is normal.      Breath sounds: Normal breath sounds.   Musculoskeletal:         General: No swelling. Normal range of motion.      Cervical back: Neck supple.   Skin:     General: Skin is warm and dry.      Coloration: Skin is not jaundiced.   Neurological:      General: No focal deficit present.      Mental Status: She is alert and oriented to person, place, and time. Mental status is at baseline.   Psychiatric:         Mood and Affect: Mood normal.         Behavior: Behavior normal.         Thought Content: Thought content normal.         Judgment: Judgment normal.     Skin lesions on the left lower leg trace edema no open areas,  Result Review :   No results found for: \"PROBNP\", \"BNP\"  CMP          7/18/2024    12:07 " 1/23/2025    12:48   CMP   Glucose 84  74    BUN 14  15    Creatinine 0.73  0.75    EGFR 78.2  75.3    Sodium 141  141    Potassium 4.5  4.5    Chloride 106  105    Calcium 9.6  9.4    Total Protein 5.9  6.2    Albumin 3.7  3.7    Globulin 2.2  2.5    Total Bilirubin 0.2  0.3    Alkaline Phosphatase 64  56    AST (SGOT) 17  21    ALT (SGPT) 9  12    Albumin/Globulin Ratio 1.7  1.5    BUN/Creatinine Ratio 19.2  20.0    Anion Gap 7.5  8.9      CBC w/diff          7/18/2024    12:07 1/23/2025    12:48   CBC w/Diff   WBC 7.26  5.86    RBC 4.07  4.15    Hemoglobin 12.0  12.4    Hematocrit 36.7  37.5    MCV 90.2  90.4    MCH 29.5  29.9    MCHC 32.7  33.1    RDW 12.1  12.0    Platelets 269  287    Neutrophil Rel % 73.9  67.0    Immature Granulocyte Rel % 0.3  0.3    Lymphocyte Rel % 16.4  23.7    Monocyte Rel % 7.6  7.8    Eosinophil Rel % 1.4  0.7    Basophil Rel % 0.4  0.5          Lab Results   Component Value Date    TSH 2.280 07/18/2024    TSH 3.640 06/17/2021    TSH 2.750 11/12/2020      Lab Results   Component Value Date    FREET4 1.08 07/18/2024    FREET4 1.2 11/12/2020      A1C Last 3 Results          7/25/2024    13:28   HGBA1C Last 3 Results   Hemoglobin A1C 5.80                        Visit Diagnoses:    ICD-10-CM ICD-9-CM   1. Other osteoarthritis of spine, cervical region  M47.892 721.0   2. Peripheral polyneuropathy  G62.9 356.9   3. Vitamin D deficiency  E55.9 268.9       Assessment and Plan   Diagnoses and all orders for this visit:    1. Other osteoarthritis of spine, cervical region (Primary)  -     CBC & Differential; Future  -     Comprehensive Metabolic Panel; Future    2. Peripheral polyneuropathy  -     CBC & Differential; Future  -     Comprehensive Metabolic Panel; Future    3. Vitamin D deficiency  -     CBC & Differential; Future  -     Comprehensive Metabolic Panel; Future    Other orders  -     metoprolol succinate XL (Toprol XL) 25 MG 24 hr tablet; Take 1 tablet by mouth Daily.  Dispense: 90  tablet; Refill: 3  -     traMADol (ULTRAM) 50 MG tablet; Take 1 tablet by mouth Daily.  Dispense: 30 tablet; Refill: 5  -     gabapentin (NEURONTIN) 600 MG tablet; Take 1 tablet by mouth 2 (Two) Times a Day.  Dispense: 60 tablet; Refill: 5        BMI is within normal parameters. No other follow-up for BMI required.     Annual wellness visit completed July 25, 2024    B12 mild deficiency July 18, 2024--- continue B12 supplement    Vitamin D deficiency mild okay to continue taking over-the-counter    Osteoarthritis of the neck of neuropathy, left neck mainly, ----continues tramadol 50 mg once a day, gabapentin twice a day    Chronic pain syndrome chronic right hip pain low back pain---cont  on tramadol 50 mg once a day, gabapentin 600 mg twice a day,    Glaucoma    appendectomy,  right total hip arthroplasty 2000, cataract surgery    spinal stenosis          Follow Up   Return in about 6 months (around 7/28/2025).  Patient was given instructions and counseling regarding her condition or for health maintenance advice. Please see specific information pulled into the AVS if appropriate.

## 2025-07-24 ENCOUNTER — LAB (OUTPATIENT)
Dept: LAB | Facility: HOSPITAL | Age: OVER 89
End: 2025-07-24
Payer: MEDICARE

## 2025-07-24 DIAGNOSIS — M47.892 OTHER OSTEOARTHRITIS OF SPINE, CERVICAL REGION: ICD-10-CM

## 2025-07-24 DIAGNOSIS — E55.9 VITAMIN D DEFICIENCY: ICD-10-CM

## 2025-07-24 DIAGNOSIS — G62.9 PERIPHERAL POLYNEUROPATHY: ICD-10-CM

## 2025-07-24 LAB
ALBUMIN SERPL-MCNC: 3.7 G/DL (ref 3.5–5.2)
ALBUMIN/GLOB SERPL: 1.4 G/DL
ALP SERPL-CCNC: 58 U/L (ref 39–117)
ALT SERPL W P-5'-P-CCNC: 6 U/L (ref 1–33)
ANION GAP SERPL CALCULATED.3IONS-SCNC: 7.9 MMOL/L (ref 5–15)
AST SERPL-CCNC: 16 U/L (ref 1–32)
BASOPHILS # BLD AUTO: 0.02 10*3/MM3 (ref 0–0.2)
BASOPHILS NFR BLD AUTO: 0.3 % (ref 0–1.5)
BILIRUB SERPL-MCNC: 0.2 MG/DL (ref 0–1.2)
BUN SERPL-MCNC: 15 MG/DL (ref 8–23)
BUN/CREAT SERPL: 23.4 (ref 7–25)
CALCIUM SPEC-SCNC: 9.7 MG/DL (ref 8.2–9.6)
CHLORIDE SERPL-SCNC: 105 MMOL/L (ref 98–107)
CO2 SERPL-SCNC: 26.1 MMOL/L (ref 22–29)
CREAT SERPL-MCNC: 0.64 MG/DL (ref 0.57–1)
DEPRECATED RDW RBC AUTO: 41.9 FL (ref 37–54)
EGFRCR SERPLBLD CKD-EPI 2021: 83.6 ML/MIN/1.73
EOSINOPHIL # BLD AUTO: 0.12 10*3/MM3 (ref 0–0.4)
EOSINOPHIL NFR BLD AUTO: 1.7 % (ref 0.3–6.2)
ERYTHROCYTE [DISTWIDTH] IN BLOOD BY AUTOMATED COUNT: 12.9 % (ref 12.3–15.4)
GLOBULIN UR ELPH-MCNC: 2.7 GM/DL
GLUCOSE SERPL-MCNC: 84 MG/DL (ref 65–99)
HCT VFR BLD AUTO: 38 % (ref 34–46.6)
HGB BLD-MCNC: 12.3 G/DL (ref 12–15.9)
IMM GRANULOCYTES # BLD AUTO: 0.02 10*3/MM3 (ref 0–0.05)
IMM GRANULOCYTES NFR BLD AUTO: 0.3 % (ref 0–0.5)
LYMPHOCYTES # BLD AUTO: 1.24 10*3/MM3 (ref 0.7–3.1)
LYMPHOCYTES NFR BLD AUTO: 17.9 % (ref 19.6–45.3)
MCH RBC QN AUTO: 29 PG (ref 26.6–33)
MCHC RBC AUTO-ENTMCNC: 32.4 G/DL (ref 31.5–35.7)
MCV RBC AUTO: 89.6 FL (ref 79–97)
MONOCYTES # BLD AUTO: 0.73 10*3/MM3 (ref 0.1–0.9)
MONOCYTES NFR BLD AUTO: 10.5 % (ref 5–12)
NEUTROPHILS NFR BLD AUTO: 4.79 10*3/MM3 (ref 1.7–7)
NEUTROPHILS NFR BLD AUTO: 69.3 % (ref 42.7–76)
NRBC BLD AUTO-RTO: 0 /100 WBC (ref 0–0.2)
PLATELET # BLD AUTO: 297 10*3/MM3 (ref 140–450)
PMV BLD AUTO: 10.1 FL (ref 6–12)
POTASSIUM SERPL-SCNC: 4.6 MMOL/L (ref 3.5–5.2)
PROT SERPL-MCNC: 6.4 G/DL (ref 6–8.5)
RBC # BLD AUTO: 4.24 10*6/MM3 (ref 3.77–5.28)
SODIUM SERPL-SCNC: 139 MMOL/L (ref 136–145)
WBC NRBC COR # BLD AUTO: 6.92 10*3/MM3 (ref 3.4–10.8)

## 2025-07-24 PROCEDURE — 80053 COMPREHEN METABOLIC PANEL: CPT

## 2025-07-24 PROCEDURE — 85025 COMPLETE CBC W/AUTO DIFF WBC: CPT

## 2025-07-24 PROCEDURE — 36415 COLL VENOUS BLD VENIPUNCTURE: CPT

## 2025-07-28 ENCOUNTER — OFFICE VISIT (OUTPATIENT)
Dept: INTERNAL MEDICINE | Age: OVER 89
End: 2025-07-28
Payer: MEDICARE

## 2025-07-28 VITALS
DIASTOLIC BLOOD PRESSURE: 62 MMHG | WEIGHT: 107.3 LBS | HEIGHT: 59 IN | OXYGEN SATURATION: 94 % | HEART RATE: 60 BPM | BODY MASS INDEX: 21.63 KG/M2 | SYSTOLIC BLOOD PRESSURE: 101 MMHG | TEMPERATURE: 97.7 F

## 2025-07-28 DIAGNOSIS — E55.9 VITAMIN D DEFICIENCY: ICD-10-CM

## 2025-07-28 DIAGNOSIS — Z00.00 MEDICARE ANNUAL WELLNESS VISIT, SUBSEQUENT: Primary | ICD-10-CM

## 2025-07-28 DIAGNOSIS — I10 HYPERTENSION, ESSENTIAL: ICD-10-CM

## 2025-07-28 DIAGNOSIS — R73.01 IFG (IMPAIRED FASTING GLUCOSE): ICD-10-CM

## 2025-07-28 DIAGNOSIS — L50.9 URTICARIA: ICD-10-CM

## 2025-07-28 DIAGNOSIS — M47.892 OTHER OSTEOARTHRITIS OF SPINE, CERVICAL REGION: ICD-10-CM

## 2025-07-28 DIAGNOSIS — G62.9 PERIPHERAL POLYNEUROPATHY: ICD-10-CM

## 2025-07-28 DIAGNOSIS — R21 RASH: ICD-10-CM

## 2025-07-28 PROCEDURE — 1170F FXNL STATUS ASSESSED: CPT | Performed by: INTERNAL MEDICINE

## 2025-07-28 PROCEDURE — G2211 COMPLEX E/M VISIT ADD ON: HCPCS | Performed by: INTERNAL MEDICINE

## 2025-07-28 PROCEDURE — G0439 PPPS, SUBSEQ VISIT: HCPCS | Performed by: INTERNAL MEDICINE

## 2025-07-28 PROCEDURE — 1126F AMNT PAIN NOTED NONE PRSNT: CPT | Performed by: INTERNAL MEDICINE

## 2025-07-28 PROCEDURE — 99213 OFFICE O/P EST LOW 20 MIN: CPT | Performed by: INTERNAL MEDICINE

## 2025-07-28 RX ORDER — TRAMADOL HYDROCHLORIDE 50 MG/1
50 TABLET ORAL EVERY 12 HOURS PRN
Qty: 180 TABLET | Refills: 3 | Status: SHIPPED | OUTPATIENT
Start: 2025-07-28

## 2025-07-28 RX ORDER — TRAMADOL HYDROCHLORIDE 50 MG/1
50 TABLET ORAL EVERY 8 HOURS PRN
Qty: 270 TABLET | Refills: 1 | Status: CANCELLED | OUTPATIENT
Start: 2025-07-28

## 2025-07-28 RX ORDER — GABAPENTIN 600 MG/1
600 TABLET ORAL 2 TIMES DAILY
Qty: 180 TABLET | Refills: 3 | Status: SHIPPED | OUTPATIENT
Start: 2025-07-28

## 2025-07-28 NOTE — ASSESSMENT & PLAN NOTE
Orders:    gabapentin (NEURONTIN) 600 MG tablet; Take 1 tablet by mouth 2 (Two) Times a Day.    traMADol (ULTRAM) 50 MG tablet; Take 1 tablet by mouth Every 12 (Twelve) Hours As Needed for Moderate Pain.

## 2025-07-28 NOTE — PROGRESS NOTES
"Chief Complaint   Patient presents with    Medicare Wellness-subsequent     Wellness visit, routine follow up, lab review, pt denies any concerns at this time.    Hypotension    Peripheral Neuropathy    Osteoarthritis        Objective   Vital Signs  Vitals:    07/28/25 1231   BP: 101/62   BP Location: Left arm   Patient Position: Sitting   Pulse: 60   Temp: 97.7 °F (36.5 °C)   SpO2: 94%   Weight: 48.7 kg (107 lb 4.8 oz)   Height: 149.9 cm (59.02\")      Body mass index is 21.66 kg/m².  Review of Systems   HENT: Negative.     Eyes: Negative.    Respiratory: Negative.     Cardiovascular: Negative.    Gastrointestinal: Negative.    Endocrine: Negative.    Genitourinary: Negative.    Musculoskeletal:  Positive for gait problem.   Allergic/Immunologic: Negative.    Neurological:  Positive for weakness.   Hematological: Negative.    Psychiatric/Behavioral: Negative.        Physical Exam  Constitutional:       General: She is not in acute distress.     Appearance: Normal appearance.   HENT:      Head: Normocephalic.      Mouth/Throat:      Mouth: Mucous membranes are moist.   Eyes:      Conjunctiva/sclera: Conjunctivae normal.      Pupils: Pupils are equal, round, and reactive to light.   Cardiovascular:      Rate and Rhythm: Normal rate and regular rhythm.      Pulses: Normal pulses.      Heart sounds: Normal heart sounds.   Pulmonary:      Effort: Pulmonary effort is normal.      Breath sounds: Normal breath sounds.   Abdominal:      General: Abdomen is flat. Bowel sounds are normal.      Palpations: Abdomen is soft.   Musculoskeletal:         General: Deformity (neck contracture to R) present. No swelling. Normal range of motion.      Cervical back: Neck supple.   Skin:     General: Skin is warm and dry.      Coloration: Skin is not jaundiced.   Neurological:      General: No focal deficit present.      Mental Status: She is alert and oriented to person, place, and time. Mental status is at baseline.   Psychiatric:       " "  Mood and Affect: Mood normal.         Behavior: Behavior normal.         Thought Content: Thought content normal.         Judgment: Judgment normal.        Result Review :   No results found for: \"PROBNP\", \"BNP\"  CMP          1/23/2025    12:48 7/24/2025    11:30   CMP   Glucose 74  84    BUN 15  15.0    Creatinine 0.75  0.64    EGFR 75.3  83.6    Sodium 141  139    Potassium 4.5  4.6    Chloride 105  105    Calcium 9.4  9.7    Total Protein 6.2  6.4    Albumin 3.7  3.7    Globulin 2.5  2.7    Total Bilirubin 0.3  0.2    Alkaline Phosphatase 56  58    AST (SGOT) 21  16    ALT (SGPT) 12  6    Albumin/Globulin Ratio 1.5  1.4    BUN/Creatinine Ratio 20.0  23.4    Anion Gap 8.9  7.9      CBC w/diff          1/23/2025    12:48 7/24/2025    11:30   CBC w/Diff   WBC 5.86  6.92    RBC 4.15  4.24    Hemoglobin 12.4  12.3    Hematocrit 37.5  38.0    MCV 90.4  89.6    MCH 29.9  29.0    MCHC 33.1  32.4    RDW 12.0  12.9    Platelets 287  297    Neutrophil Rel % 67.0  69.3    Immature Granulocyte Rel % 0.3  0.3    Lymphocyte Rel % 23.7  17.9    Monocyte Rel % 7.8  10.5    Eosinophil Rel % 0.7  1.7    Basophil Rel % 0.5  0.3          Lab Results   Component Value Date    TSH 2.280 07/18/2024    TSH 3.640 06/17/2021    TSH 2.750 11/12/2020      Lab Results   Component Value Date    FREET4 1.08 07/18/2024    FREET4 1.2 11/12/2020                          Visit Diagnoses:    ICD-10-CM ICD-9-CM   1. Medicare annual wellness visit, subsequent  Z00.00 V70.0   2. Peripheral polyneuropathy  G62.9 356.9   3. Urticaria  L50.9 708.9   4. Rash  R21 782.1   5. Other osteoarthritis of spine, cervical region  M47.892 721.0   6. Vitamin D deficiency  E55.9 268.9   7. Hypertension, essential  I10 401.9   8. IFG (impaired fasting glucose)  R73.01 790.21       Assessment and Plan   Diagnoses and all orders for this visit:    1. Medicare annual wellness visit, subsequent (Primary)  Assessment & Plan:    Orders:    traMADol (ULTRAM) 50 MG tablet; " Take 1 tablet by mouth Every 12 (Twelve) Hours As Needed for Moderate Pain.      Orders:  -     traMADol (ULTRAM) 50 MG tablet; Take 1 tablet by mouth Every 12 (Twelve) Hours As Needed for Moderate Pain.  Dispense: 180 tablet; Refill: 3  -     CBC & Differential; Future  -     Comprehensive Metabolic Panel; Future  -     Vitamin B12 anemia; Future  -     Folate anemia; Future  -     Vitamin D,25-Hydroxy; Future    2. Peripheral polyneuropathy  Assessment & Plan:    Orders:    gabapentin (NEURONTIN) 600 MG tablet; Take 1 tablet by mouth 2 (Two) Times a Day.    traMADol (ULTRAM) 50 MG tablet; Take 1 tablet by mouth Every 12 (Twelve) Hours As Needed for Moderate Pain.      Orders:  -     gabapentin (NEURONTIN) 600 MG tablet; Take 1 tablet by mouth 2 (Two) Times a Day.  Dispense: 180 tablet; Refill: 3  -     traMADol (ULTRAM) 50 MG tablet; Take 1 tablet by mouth Every 12 (Twelve) Hours As Needed for Moderate Pain.  Dispense: 180 tablet; Refill: 3  -     CBC & Differential; Future  -     Comprehensive Metabolic Panel; Future  -     Vitamin B12 anemia; Future  -     Folate anemia; Future  -     Vitamin D,25-Hydroxy; Future    3. Urticaria  Assessment & Plan:    Orders:    traMADol (ULTRAM) 50 MG tablet; Take 1 tablet by mouth Every 12 (Twelve) Hours As Needed for Moderate Pain.      Orders:  -     traMADol (ULTRAM) 50 MG tablet; Take 1 tablet by mouth Every 12 (Twelve) Hours As Needed for Moderate Pain.  Dispense: 180 tablet; Refill: 3  -     CBC & Differential; Future  -     Comprehensive Metabolic Panel; Future  -     Vitamin B12 anemia; Future  -     Folate anemia; Future  -     Vitamin D,25-Hydroxy; Future    4. Rash  Assessment & Plan:    Orders:    traMADol (ULTRAM) 50 MG tablet; Take 1 tablet by mouth Every 12 (Twelve) Hours As Needed for Moderate Pain.      Orders:  -     traMADol (ULTRAM) 50 MG tablet; Take 1 tablet by mouth Every 12 (Twelve) Hours As Needed for Moderate Pain.  Dispense: 180 tablet; Refill: 3  -      CBC & Differential; Future  -     Comprehensive Metabolic Panel; Future  -     Vitamin B12 anemia; Future  -     Folate anemia; Future  -     Vitamin D,25-Hydroxy; Future    5. Other osteoarthritis of spine, cervical region  Assessment & Plan:    Orders:    traMADol (ULTRAM) 50 MG tablet; Take 1 tablet by mouth Every 12 (Twelve) Hours As Needed for Moderate Pain.      Orders:  -     traMADol (ULTRAM) 50 MG tablet; Take 1 tablet by mouth Every 12 (Twelve) Hours As Needed for Moderate Pain.  Dispense: 180 tablet; Refill: 3  -     CBC & Differential; Future  -     Comprehensive Metabolic Panel; Future  -     Vitamin B12 anemia; Future  -     Folate anemia; Future  -     Vitamin D,25-Hydroxy; Future    6. Vitamin D deficiency  Assessment & Plan:    Orders:    traMADol (ULTRAM) 50 MG tablet; Take 1 tablet by mouth Every 12 (Twelve) Hours As Needed for Moderate Pain.      Orders:  -     traMADol (ULTRAM) 50 MG tablet; Take 1 tablet by mouth Every 12 (Twelve) Hours As Needed for Moderate Pain.  Dispense: 180 tablet; Refill: 3  -     CBC & Differential; Future  -     Comprehensive Metabolic Panel; Future  -     Vitamin B12 anemia; Future  -     Folate anemia; Future  -     Vitamin D,25-Hydroxy; Future    7. Hypertension, essential  Assessment & Plan:  Hypertension is stable and controlled  Continue current treatment regimen.  Blood pressure will be reassessed in 6 months.    Orders:    traMADol (ULTRAM) 50 MG tablet; Take 1 tablet by mouth Every 12 (Twelve) Hours As Needed for Moderate Pain.      Orders:  -     traMADol (ULTRAM) 50 MG tablet; Take 1 tablet by mouth Every 12 (Twelve) Hours As Needed for Moderate Pain.  Dispense: 180 tablet; Refill: 3  -     CBC & Differential; Future  -     Comprehensive Metabolic Panel; Future  -     Vitamin B12 anemia; Future  -     Folate anemia; Future  -     Vitamin D,25-Hydroxy; Future    8. IFG (impaired fasting glucose)  Assessment & Plan:    Orders:    traMADol (ULTRAM) 50 MG  tablet; Take 1 tablet by mouth Every 12 (Twelve) Hours As Needed for Moderate Pain.      Orders:  -     traMADol (ULTRAM) 50 MG tablet; Take 1 tablet by mouth Every 12 (Twelve) Hours As Needed for Moderate Pain.  Dispense: 180 tablet; Refill: 3  -     CBC & Differential; Future  -     Comprehensive Metabolic Panel; Future  -     Vitamin B12 anemia; Future  -     Folate anemia; Future  -     Vitamin D,25-Hydroxy; Future    Annual wellness visit completed July 28, 2025    B12 mild deficiency July 18, 2024--- continue B12 supplement    Vitamin D deficiency mild okay to continue taking over-the-counter    Osteoarthritis of the neck of neuropathy, left neck mainly, ----continues tramadol 50 mg once/twice a day, gabapentin twice a day    Chronic pain syndrome chronic right hip pain low back pain---cont  on tramadol 50 mg once, to twice a day, gabapentin 600 mg twice a day,    Glaucoma    appendectomy,  right total hip arthroplasty 2000, cataract surgery    spinal stenosis        Follow Up   Return in about 6 months (around 1/28/2026).  Patient was given instructions and counseling regarding her condition or for health maintenance advice. Please see specific information pulled into the AVS if appropriate.

## 2025-07-28 NOTE — ASSESSMENT & PLAN NOTE
Hypertension is stable and controlled  Continue current treatment regimen.  Blood pressure will be reassessed in 6 months.    Orders:    traMADol (ULTRAM) 50 MG tablet; Take 1 tablet by mouth Every 12 (Twelve) Hours As Needed for Moderate Pain.

## 2025-07-28 NOTE — PROGRESS NOTES
Subjective   The ABCs of the Annual Wellness Visit  Medicare Wellness Visit      Liudmila Liu is a 89 y.o. patient who presents for a Medicare Wellness Visit.    The following portions of the patient's history were reviewed and   updated as appropriate: allergies, current medications, past family history, past medical history, past social history, past surgical history, and problem list.    Compared to one year ago, the patient's physical   health is the same.  Compared to one year ago, the patient's mental   health is the same.    Recent Hospitalizations:  She was not admitted to the hospital during the last year.     Current Medical Providers:  Patient Care Team:  Iron Martinez MD as PCP - General (Internal Medicine)    Outpatient Medications Prior to Visit   Medication Sig Dispense Refill    brimonidine-timolol (Combigan) 0.2-0.5 % ophthalmic solution 1 drop.      latanoprost (XALATAN) 0.005 % ophthalmic solution latanoprost 0.005 % eye drops      metoprolol succinate XL (Toprol XL) 25 MG 24 hr tablet Take 1 tablet by mouth Daily. 90 tablet 3    pantoprazole (PROTONIX) 40 MG EC tablet TAKE 1 TABLET DAILY 90 tablet 3    traMADol (ULTRAM) 50 MG tablet Take 1 tablet by mouth 3 (Three) Times a Day As Needed for Moderate Pain. for pain (Patient taking differently: Take 1 tablet by mouth Daily. Takes one tablet at am) 90 tablet 5    gabapentin (NEURONTIN) 600 MG tablet Take 1 tablet by mouth 2 (Two) Times a Day. 60 tablet 5    gabapentin (NEURONTIN) 600 MG tablet TAKE 1 TABLET THREE TIMES A DAY (Patient taking differently: Take 1 tablet by mouth 2 (Two) Times a Day. Takes one tablet in am and one tablet at pm) 270 tablet 1    ketoconazole (NIZORAL) 2 % shampoo APPLY TOPICALLY TO THE APPROPRIATE AREA AS DIRECTED TWICE WEEKLY 240 mL 1    traMADol (ULTRAM) 50 MG tablet Take 1 tablet by mouth Daily. 30 tablet 5    triamcinolone (KENALOG) 0.1 % cream Apply 1 Application topically to the appropriate area as  "directed 2 (Two) Times a Day. 454 g 2     No facility-administered medications prior to visit.     Opioid medication/s are on active medication list.  and I have evaluated her active treatment plan and pain score trends (see table).  Vitals:    07/28/25 1231   PainSc: 0-No pain     I have reviewed the chart for potential of high risk medication and harmful drug interactions in the elderly.        Aspirin is not on active medication list.  Aspirin use is not indicated based on review of current medical condition/s. Risk of harm outweighs potential benefits.  .    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  ACP discussion was held with the patient during this visit. Patient has an advance directive in EMR which is still valid.             Objective   Vitals:    07/28/25 1231   BP: 101/62   BP Location: Left arm   Patient Position: Sitting   Pulse: 60   Temp: 97.7 °F (36.5 °C)   SpO2: 94%   Weight: 48.7 kg (107 lb 4.8 oz)   Height: 149.9 cm (59.02\")   PainSc: 0-No pain       Estimated body mass index is 21.66 kg/m² as calculated from the following:    Height as of this encounter: 149.9 cm (59.02\").    Weight as of this encounter: 48.7 kg (107 lb 4.8 oz).    BMI is within normal parameters. No other follow-up for BMI required.           Does the patient have evidence of cognitive impairment? No                                                                                               Health  Risk Assessment    Smoking Status:  Social History     Tobacco Use   Smoking Status Never   Smokeless Tobacco Never     Alcohol " Consumption:  Social History     Substance and Sexual Activity   Alcohol Use Never       Fall Risk Screen  STEADI Fall Risk Assessment was completed, and patient is at LOW risk for falls.Assessment completed on:2025    Depression Screening   Little interest or pleasure in doing things? Not at all   Feeling down, depressed, or hopeless? Not at all   PHQ-2 Total Score 0      Health Habits and Functional and Cognitive Screenin/28/2025    12:40 PM   Functional & Cognitive Status   Do you have difficulty preparing food and eating? No   Do you have difficulty bathing yourself, getting dressed or grooming yourself? No   Do you have difficulty using the toilet? No   Do you have difficulty moving around from place to place? No   Do you have trouble with steps or getting out of a bed or a chair? Yes   Current Diet Well Balanced Diet   Dental Exam Up to date   Eye Exam Up to date   Exercise (times per week) 0 times per week   Current Exercises Include No Regular Exercise   Do you need help using the phone?  No   Are you deaf or do you have serious difficulty hearing?  No   Do you need help to go to places out of walking distance? Yes   Do you need help shopping? Yes   Do you need help preparing meals?  No   Do you need help with housework?  Yes   Do you need help with laundry? No   Do you need help taking your medications? No   Do you need help managing money? No   Do you ever drive or ride in a car without wearing a seat belt? No   Have you felt unusual fatigue (could be tiredness), stress, anger or loneliness in the last month? No   Who do you live with? Alone   If you need help, do you have trouble finding someone available to you? No   Have you been bothered in the last four weeks by sexual problems? No   Do you have difficulty concentrating, remembering or making decisions? No           Age-appropriate Screening Schedule:  Refer to the list below for future screening recommendations based on patient's age,  sex and/or medical conditions. Orders for these recommended tests are listed in the plan section. The patient has been provided with a written plan.    Health Maintenance List  Health Maintenance   Topic Date Due    DXA SCAN  Never done    TDAP/TD VACCINES (1 - Tdap) Never done    RSV Vaccine - Adults (1 - 1-dose 75+ series) Never done    COVID-19 Vaccine (6 - 2024-25 season) 12/31/2025 (Originally 9/1/2024)    INFLUENZA VACCINE  10/01/2025    ANNUAL WELLNESS VISIT  07/28/2026    Pneumococcal Vaccine 50+  Completed    ZOSTER VACCINE  Completed                                                                                                                                                CMS Preventative Services Quick Reference  Risk Factors Identified During Encounter  None Identified    The above risks/problems have been discussed with the patient.  Pertinent information has been shared with the patient in the After Visit Summary.  An After Visit Summary and PPPS were made available to the patient.    Follow Up:   Next Medicare Wellness visit to be scheduled in 1 year.     Assessment & Plan  Peripheral polyneuropathy    Orders:    gabapentin (NEURONTIN) 600 MG tablet; Take 1 tablet by mouth 2 (Two) Times a Day.    traMADol (ULTRAM) 50 MG tablet; Take 1 tablet by mouth Every 12 (Twelve) Hours As Needed for Moderate Pain.    Urticaria    Orders:    traMADol (ULTRAM) 50 MG tablet; Take 1 tablet by mouth Every 12 (Twelve) Hours As Needed for Moderate Pain.    Rash    Orders:    traMADol (ULTRAM) 50 MG tablet; Take 1 tablet by mouth Every 12 (Twelve) Hours As Needed for Moderate Pain.    Other osteoarthritis of spine, cervical region    Orders:    traMADol (ULTRAM) 50 MG tablet; Take 1 tablet by mouth Every 12 (Twelve) Hours As Needed for Moderate Pain.    Vitamin D deficiency    Orders:    traMADol (ULTRAM) 50 MG tablet; Take 1 tablet by mouth Every 12 (Twelve) Hours As Needed for Moderate Pain.    Medicare annual  wellness visit, subsequent    Orders:    traMADol (ULTRAM) 50 MG tablet; Take 1 tablet by mouth Every 12 (Twelve) Hours As Needed for Moderate Pain.    Hypertension, essential  Hypertension is stable and controlled  Continue current treatment regimen.  Blood pressure will be reassessed in 6 months.    Orders:    traMADol (ULTRAM) 50 MG tablet; Take 1 tablet by mouth Every 12 (Twelve) Hours As Needed for Moderate Pain.    IFG (impaired fasting glucose)    Orders:    traMADol (ULTRAM) 50 MG tablet; Take 1 tablet by mouth Every 12 (Twelve) Hours As Needed for Moderate Pain.         Follow Up:   Return in about 6 months (around 1/28/2026).